# Patient Record
Sex: MALE | Race: BLACK OR AFRICAN AMERICAN | Employment: OTHER | URBAN - METROPOLITAN AREA
[De-identification: names, ages, dates, MRNs, and addresses within clinical notes are randomized per-mention and may not be internally consistent; named-entity substitution may affect disease eponyms.]

---

## 2019-10-15 ENCOUNTER — APPOINTMENT (OUTPATIENT)
Dept: GENERAL RADIOLOGY | Age: 64
DRG: 041 | End: 2019-10-15
Payer: MEDICARE

## 2019-10-15 ENCOUNTER — APPOINTMENT (OUTPATIENT)
Dept: CT IMAGING | Age: 64
DRG: 041 | End: 2019-10-15
Payer: MEDICARE

## 2019-10-15 ENCOUNTER — HOSPITAL ENCOUNTER (INPATIENT)
Age: 64
LOS: 4 days | Discharge: HOME HEALTH | DRG: 041 | End: 2019-10-19
Attending: EMERGENCY MEDICINE | Admitting: PSYCHIATRY & NEUROLOGY
Payer: MEDICARE

## 2019-10-15 DIAGNOSIS — I63.9 CEREBROVASCULAR ACCIDENT (CVA), UNSPECIFIED MECHANISM (HCC): Primary | ICD-10-CM

## 2019-10-15 LAB
% CKMB: 2.4 % (ref 0–3.5)
ABSOLUTE EOS #: 0.46 K/UL (ref 0–0.44)
ABSOLUTE IMMATURE GRANULOCYTE: 0.03 K/UL (ref 0–0.3)
ABSOLUTE LYMPH #: 2.54 K/UL (ref 1.1–3.7)
ABSOLUTE MONO #: 0.69 K/UL (ref 0.1–1.2)
ALLEN TEST: NORMAL
ANION GAP SERPL CALCULATED.3IONS-SCNC: 12 MMOL/L (ref 9–17)
ANION GAP: 10 MMOL/L (ref 7–16)
BASOPHILS # BLD: 1 % (ref 0–2)
BASOPHILS ABSOLUTE: 0.06 K/UL (ref 0–0.2)
BUN BLDV-MCNC: 8 MG/DL (ref 8–23)
BUN/CREAT BLD: ABNORMAL (ref 9–20)
CALCIUM SERPL-MCNC: 9.7 MG/DL (ref 8.6–10.4)
CHLORIDE BLD-SCNC: 98 MMOL/L (ref 98–107)
CK MB: 3.3 NG/ML
CKMB INTERPRETATION: ABNORMAL
CO2: 25 MMOL/L (ref 20–31)
CREAT SERPL-MCNC: 0.83 MG/DL (ref 0.7–1.2)
DIFFERENTIAL TYPE: ABNORMAL
EOSINOPHILS RELATIVE PERCENT: 6 % (ref 1–4)
FIO2: NORMAL
GFR AFRICAN AMERICAN: >60 ML/MIN
GFR NON-AFRICAN AMERICAN: >60 ML/MIN
GFR NON-AFRICAN AMERICAN: >60 ML/MIN
GFR SERPL CREATININE-BSD FRML MDRD: >60 ML/MIN
GFR SERPL CREATININE-BSD FRML MDRD: ABNORMAL ML/MIN/{1.73_M2}
GFR SERPL CREATININE-BSD FRML MDRD: ABNORMAL ML/MIN/{1.73_M2}
GFR SERPL CREATININE-BSD FRML MDRD: NORMAL ML/MIN/{1.73_M2}
GLUCOSE BLD-MCNC: 315 MG/DL (ref 75–110)
GLUCOSE BLD-MCNC: 344 MG/DL (ref 74–100)
GLUCOSE BLD-MCNC: 353 MG/DL (ref 70–99)
HCO3 VENOUS: 27.3 MMOL/L (ref 22–29)
HCT VFR BLD CALC: 47.2 % (ref 40.7–50.3)
HEMOGLOBIN: 15.6 G/DL (ref 13–17)
IMMATURE GRANULOCYTES: 0 %
INR BLD: 1.2
LYMPHOCYTES # BLD: 32 % (ref 24–43)
MCH RBC QN AUTO: 28.5 PG (ref 25.2–33.5)
MCHC RBC AUTO-ENTMCNC: 33.1 G/DL (ref 28.4–34.8)
MCV RBC AUTO: 86.1 FL (ref 82.6–102.9)
MODE: NORMAL
MONOCYTES # BLD: 9 % (ref 3–12)
MYOGLOBIN: 24 NG/ML (ref 28–72)
NEGATIVE BASE EXCESS, VEN: NORMAL (ref 0–2)
NRBC AUTOMATED: 0 PER 100 WBC
O2 DEVICE/FLOW/%: NORMAL
O2 SAT, VEN: 67 % (ref 60–85)
PARTIAL THROMBOPLASTIN TIME: 26.4 SEC (ref 20.5–30.5)
PATIENT TEMP: NORMAL
PCO2, VEN: 41.3 MM HG (ref 41–51)
PDW BLD-RTO: 13.7 % (ref 11.8–14.4)
PH VENOUS: 7.43 (ref 7.32–7.43)
PLATELET # BLD: 201 K/UL (ref 138–453)
PLATELET ESTIMATE: ABNORMAL
PMV BLD AUTO: 11.3 FL (ref 8.1–13.5)
PO2, VEN: 33.8 MM HG (ref 30–50)
POC CHLORIDE: 99 MMOL/L (ref 98–107)
POC CREATININE: 0.77 MG/DL (ref 0.51–1.19)
POC HEMATOCRIT: 47 % (ref 41–53)
POC HEMOGLOBIN: 16 G/DL (ref 13.5–17.5)
POC IONIZED CALCIUM: 1.18 MMOL/L (ref 1.15–1.33)
POC LACTIC ACID: 1.62 MMOL/L (ref 0.56–1.39)
POC PCO2 TEMP: NORMAL MM HG
POC PH TEMP: NORMAL
POC PO2 TEMP: NORMAL MM HG
POC POTASSIUM: 3.9 MMOL/L (ref 3.5–4.5)
POC SODIUM: 136 MMOL/L (ref 138–146)
POSITIVE BASE EXCESS, VEN: 3 (ref 0–3)
POTASSIUM SERPL-SCNC: 4.1 MMOL/L (ref 3.7–5.3)
PROTHROMBIN TIME: 12.1 SEC (ref 9–12)
RBC # BLD: 5.48 M/UL (ref 4.21–5.77)
RBC # BLD: ABNORMAL 10*6/UL
SAMPLE SITE: NORMAL
SEG NEUTROPHILS: 52 % (ref 36–65)
SEGMENTED NEUTROPHILS ABSOLUTE COUNT: 4.2 K/UL (ref 1.5–8.1)
SODIUM BLD-SCNC: 135 MMOL/L (ref 135–144)
TOTAL CK: 138 U/L (ref 39–308)
TOTAL CO2, VENOUS: 29 MMOL/L (ref 23–30)
TROPONIN INTERP: ABNORMAL
TROPONIN T: ABNORMAL NG/ML
TROPONIN, HIGH SENSITIVITY: 17 NG/L (ref 0–22)
WBC # BLD: 8 K/UL (ref 3.5–11.3)
WBC # BLD: ABNORMAL 10*3/UL

## 2019-10-15 PROCEDURE — 70450 CT HEAD/BRAIN W/O DYE: CPT

## 2019-10-15 PROCEDURE — 2580000003 HC RX 258: Performed by: STUDENT IN AN ORGANIZED HEALTH CARE EDUCATION/TRAINING PROGRAM

## 2019-10-15 PROCEDURE — 84295 ASSAY OF SERUM SODIUM: CPT

## 2019-10-15 PROCEDURE — 82947 ASSAY GLUCOSE BLOOD QUANT: CPT

## 2019-10-15 PROCEDURE — 99285 EMERGENCY DEPT VISIT HI MDM: CPT

## 2019-10-15 PROCEDURE — 93005 ELECTROCARDIOGRAM TRACING: CPT | Performed by: STUDENT IN AN ORGANIZED HEALTH CARE EDUCATION/TRAINING PROGRAM

## 2019-10-15 PROCEDURE — 99291 CRITICAL CARE FIRST HOUR: CPT | Performed by: PSYCHIATRY & NEUROLOGY

## 2019-10-15 PROCEDURE — 70496 CT ANGIOGRAPHY HEAD: CPT

## 2019-10-15 PROCEDURE — 84484 ASSAY OF TROPONIN QUANT: CPT

## 2019-10-15 PROCEDURE — 82803 BLOOD GASES ANY COMBINATION: CPT

## 2019-10-15 PROCEDURE — 82435 ASSAY OF BLOOD CHLORIDE: CPT

## 2019-10-15 PROCEDURE — 71045 X-RAY EXAM CHEST 1 VIEW: CPT

## 2019-10-15 PROCEDURE — 83605 ASSAY OF LACTIC ACID: CPT

## 2019-10-15 PROCEDURE — 82550 ASSAY OF CK (CPK): CPT

## 2019-10-15 PROCEDURE — 82553 CREATINE MB FRACTION: CPT

## 2019-10-15 PROCEDURE — 99284 EMERGENCY DEPT VISIT MOD MDM: CPT | Performed by: PSYCHIATRY & NEUROLOGY

## 2019-10-15 PROCEDURE — 82330 ASSAY OF CALCIUM: CPT

## 2019-10-15 PROCEDURE — C9248 INJ, CLEVIDIPINE BUTYRATE: HCPCS | Performed by: STUDENT IN AN ORGANIZED HEALTH CARE EDUCATION/TRAINING PROGRAM

## 2019-10-15 PROCEDURE — 83874 ASSAY OF MYOGLOBIN: CPT

## 2019-10-15 PROCEDURE — 96376 TX/PRO/DX INJ SAME DRUG ADON: CPT

## 2019-10-15 PROCEDURE — 82565 ASSAY OF CREATININE: CPT

## 2019-10-15 PROCEDURE — 2000000003 HC NEURO ICU R&B

## 2019-10-15 PROCEDURE — 80048 BASIC METABOLIC PNL TOTAL CA: CPT

## 2019-10-15 PROCEDURE — 3E03317 INTRODUCTION OF OTHER THROMBOLYTIC INTO PERIPHERAL VEIN, PERCUTANEOUS APPROACH: ICD-10-PCS | Performed by: EMERGENCY MEDICINE

## 2019-10-15 PROCEDURE — 85730 THROMBOPLASTIN TIME PARTIAL: CPT

## 2019-10-15 PROCEDURE — 85014 HEMATOCRIT: CPT

## 2019-10-15 PROCEDURE — 6370000000 HC RX 637 (ALT 250 FOR IP): Performed by: STUDENT IN AN ORGANIZED HEALTH CARE EDUCATION/TRAINING PROGRAM

## 2019-10-15 PROCEDURE — 6360000004 HC RX CONTRAST MEDICATION: Performed by: STUDENT IN AN ORGANIZED HEALTH CARE EDUCATION/TRAINING PROGRAM

## 2019-10-15 PROCEDURE — 96375 TX/PRO/DX INJ NEW DRUG ADDON: CPT

## 2019-10-15 PROCEDURE — 85025 COMPLETE CBC W/AUTO DIFF WBC: CPT

## 2019-10-15 PROCEDURE — 6360000002 HC RX W HCPCS: Performed by: STUDENT IN AN ORGANIZED HEALTH CARE EDUCATION/TRAINING PROGRAM

## 2019-10-15 PROCEDURE — 84132 ASSAY OF SERUM POTASSIUM: CPT

## 2019-10-15 PROCEDURE — 96365 THER/PROPH/DIAG IV INF INIT: CPT

## 2019-10-15 PROCEDURE — 85610 PROTHROMBIN TIME: CPT

## 2019-10-15 RX ORDER — SODIUM CHLORIDE 0.9 % (FLUSH) 0.9 %
10 SYRINGE (ML) INJECTION EVERY 12 HOURS SCHEDULED
Status: DISCONTINUED | OUTPATIENT
Start: 2019-10-15 | End: 2019-10-19 | Stop reason: HOSPADM

## 2019-10-15 RX ORDER — DEXTROSE MONOHYDRATE 25 G/50ML
12.5 INJECTION, SOLUTION INTRAVENOUS
Status: ACTIVE | OUTPATIENT
Start: 2019-10-15 | End: 2019-10-15

## 2019-10-15 RX ORDER — SODIUM CHLORIDE 0.9 % (FLUSH) 0.9 %
10 SYRINGE (ML) INJECTION PRN
Status: DISCONTINUED | OUTPATIENT
Start: 2019-10-15 | End: 2019-10-19 | Stop reason: HOSPADM

## 2019-10-15 RX ORDER — 0.9 % SODIUM CHLORIDE 0.9 %
50 INTRAVENOUS SOLUTION INTRAVENOUS ONCE
Status: COMPLETED | OUTPATIENT
Start: 2019-10-15 | End: 2019-10-16

## 2019-10-15 RX ADMIN — IOHEXOL 90 ML: 350 INJECTION, SOLUTION INTRAVENOUS at 22:07

## 2019-10-15 RX ADMIN — SODIUM CHLORIDE 50 ML: 9 INJECTION, SOLUTION INTRAVENOUS at 22:05

## 2019-10-15 RX ADMIN — INSULIN HUMAN 10 UNITS: 100 INJECTION, SOLUTION PARENTERAL at 22:27

## 2019-10-15 RX ADMIN — ALTEPLASE 80.8 MG: KIT at 21:12

## 2019-10-15 RX ADMIN — CLEVIPIDINE 4 MG/HR: 0.5 EMULSION INTRAVENOUS at 22:48

## 2019-10-15 RX ADMIN — CLEVIPIDINE 2 MG/HR: 0.5 EMULSION INTRAVENOUS at 22:16

## 2019-10-15 RX ADMIN — ALTEPLASE 9 MG: KIT at 21:08

## 2019-10-15 SDOH — HEALTH STABILITY: MENTAL HEALTH: HOW OFTEN DO YOU HAVE A DRINK CONTAINING ALCOHOL?: NEVER

## 2019-10-16 ENCOUNTER — APPOINTMENT (OUTPATIENT)
Dept: CT IMAGING | Age: 64
DRG: 041 | End: 2019-10-16
Payer: MEDICARE

## 2019-10-16 LAB
ABSOLUTE EOS #: 0.62 K/UL (ref 0–0.44)
ABSOLUTE IMMATURE GRANULOCYTE: <0.03 K/UL (ref 0–0.3)
ABSOLUTE LYMPH #: 2.13 K/UL (ref 1.1–3.7)
ABSOLUTE MONO #: 0.62 K/UL (ref 0.1–1.2)
ANION GAP SERPL CALCULATED.3IONS-SCNC: 16 MMOL/L (ref 9–17)
BASOPHILS # BLD: 1 % (ref 0–2)
BASOPHILS ABSOLUTE: 0.08 K/UL (ref 0–0.2)
BUN BLDV-MCNC: 5 MG/DL (ref 8–23)
BUN/CREAT BLD: ABNORMAL (ref 9–20)
CALCIUM SERPL-MCNC: 9.2 MG/DL (ref 8.6–10.4)
CHLORIDE BLD-SCNC: 101 MMOL/L (ref 98–107)
CHOLESTEROL/HDL RATIO: 7.7
CHOLESTEROL: 294 MG/DL
CO2: 20 MMOL/L (ref 20–31)
CREAT SERPL-MCNC: 0.48 MG/DL (ref 0.7–1.2)
DIFFERENTIAL TYPE: ABNORMAL
EKG ATRIAL RATE: 81 BPM
EKG P AXIS: 67 DEGREES
EKG P-R INTERVAL: 174 MS
EKG Q-T INTERVAL: 378 MS
EKG QRS DURATION: 90 MS
EKG QTC CALCULATION (BAZETT): 439 MS
EKG R AXIS: -15 DEGREES
EKG T AXIS: 24 DEGREES
EKG VENTRICULAR RATE: 81 BPM
EOSINOPHILS RELATIVE PERCENT: 9 % (ref 1–4)
ESTIMATED AVERAGE GLUCOSE: 306 MG/DL
GFR AFRICAN AMERICAN: >60 ML/MIN
GFR NON-AFRICAN AMERICAN: >60 ML/MIN
GFR SERPL CREATININE-BSD FRML MDRD: ABNORMAL ML/MIN/{1.73_M2}
GFR SERPL CREATININE-BSD FRML MDRD: ABNORMAL ML/MIN/{1.73_M2}
GLUCOSE BLD-MCNC: 211 MG/DL
GLUCOSE BLD-MCNC: 211 MG/DL (ref 75–110)
GLUCOSE BLD-MCNC: 257 MG/DL (ref 75–110)
GLUCOSE BLD-MCNC: 264 MG/DL (ref 70–99)
GLUCOSE BLD-MCNC: 265 MG/DL (ref 75–110)
GLUCOSE BLD-MCNC: 310 MG/DL (ref 75–110)
GLUCOSE BLD-MCNC: 315 MG/DL (ref 75–110)
HBA1C MFR BLD: 12.3 % (ref 4–6)
HCT VFR BLD CALC: 49.9 % (ref 40.7–50.3)
HDLC SERPL-MCNC: 38 MG/DL
HEMOGLOBIN: 16.4 G/DL (ref 13–17)
IMMATURE GRANULOCYTES: 0 %
LDL CHOLESTEROL: 216 MG/DL (ref 0–130)
LYMPHOCYTES # BLD: 29 % (ref 24–43)
MAGNESIUM: 2 MG/DL (ref 1.6–2.6)
MCH RBC QN AUTO: 28.6 PG (ref 25.2–33.5)
MCHC RBC AUTO-ENTMCNC: 32.9 G/DL (ref 28.4–34.8)
MCV RBC AUTO: 86.9 FL (ref 82.6–102.9)
MONOCYTES # BLD: 9 % (ref 3–12)
MRSA, DNA, NASAL: NORMAL
NRBC AUTOMATED: 0 PER 100 WBC
PDW BLD-RTO: 13.8 % (ref 11.8–14.4)
PHOSPHORUS: 3.2 MG/DL (ref 2.5–4.5)
PLATELET # BLD: 221 K/UL (ref 138–453)
PLATELET ESTIMATE: ABNORMAL
PMV BLD AUTO: 10.1 FL (ref 8.1–13.5)
POTASSIUM SERPL-SCNC: 3.9 MMOL/L (ref 3.7–5.3)
RBC # BLD: 5.74 M/UL (ref 4.21–5.77)
RBC # BLD: ABNORMAL 10*6/UL
SEG NEUTROPHILS: 52 % (ref 36–65)
SEGMENTED NEUTROPHILS ABSOLUTE COUNT: 3.86 K/UL (ref 1.5–8.1)
SODIUM BLD-SCNC: 137 MMOL/L (ref 135–144)
SPECIMEN DESCRIPTION: NORMAL
TRIGL SERPL-MCNC: 200 MG/DL
VLDLC SERPL CALC-MCNC: ABNORMAL MG/DL (ref 1–30)
WBC # BLD: 7.3 K/UL (ref 3.5–11.3)
WBC # BLD: ABNORMAL 10*3/UL

## 2019-10-16 PROCEDURE — 97166 OT EVAL MOD COMPLEX 45 MIN: CPT

## 2019-10-16 PROCEDURE — 80048 BASIC METABOLIC PNL TOTAL CA: CPT

## 2019-10-16 PROCEDURE — 84100 ASSAY OF PHOSPHORUS: CPT

## 2019-10-16 PROCEDURE — 36415 COLL VENOUS BLD VENIPUNCTURE: CPT

## 2019-10-16 PROCEDURE — 97530 THERAPEUTIC ACTIVITIES: CPT

## 2019-10-16 PROCEDURE — 70450 CT HEAD/BRAIN W/O DYE: CPT

## 2019-10-16 PROCEDURE — 2580000003 HC RX 258: Performed by: STUDENT IN AN ORGANIZED HEALTH CARE EDUCATION/TRAINING PROGRAM

## 2019-10-16 PROCEDURE — 2060000000 HC ICU INTERMEDIATE R&B

## 2019-10-16 PROCEDURE — 6370000000 HC RX 637 (ALT 250 FOR IP): Performed by: NURSE PRACTITIONER

## 2019-10-16 PROCEDURE — 6370000000 HC RX 637 (ALT 250 FOR IP): Performed by: INTERNAL MEDICINE

## 2019-10-16 PROCEDURE — 85025 COMPLETE CBC W/AUTO DIFF WBC: CPT

## 2019-10-16 PROCEDURE — 97162 PT EVAL MOD COMPLEX 30 MIN: CPT

## 2019-10-16 PROCEDURE — 83735 ASSAY OF MAGNESIUM: CPT

## 2019-10-16 PROCEDURE — 6370000000 HC RX 637 (ALT 250 FOR IP): Performed by: STUDENT IN AN ORGANIZED HEALTH CARE EDUCATION/TRAINING PROGRAM

## 2019-10-16 PROCEDURE — 87641 MR-STAPH DNA AMP PROBE: CPT

## 2019-10-16 PROCEDURE — 2000000003 HC NEURO ICU R&B

## 2019-10-16 PROCEDURE — 80061 LIPID PANEL: CPT

## 2019-10-16 PROCEDURE — C9248 INJ, CLEVIDIPINE BUTYRATE: HCPCS | Performed by: STUDENT IN AN ORGANIZED HEALTH CARE EDUCATION/TRAINING PROGRAM

## 2019-10-16 PROCEDURE — 82947 ASSAY GLUCOSE BLOOD QUANT: CPT

## 2019-10-16 PROCEDURE — 92523 SPEECH SOUND LANG COMPREHEN: CPT

## 2019-10-16 PROCEDURE — 6360000002 HC RX W HCPCS: Performed by: STUDENT IN AN ORGANIZED HEALTH CARE EDUCATION/TRAINING PROGRAM

## 2019-10-16 PROCEDURE — 83036 HEMOGLOBIN GLYCOSYLATED A1C: CPT

## 2019-10-16 PROCEDURE — 97535 SELF CARE MNGMENT TRAINING: CPT

## 2019-10-16 RX ORDER — ASPIRIN 300 MG/1
300 SUPPOSITORY RECTAL ONCE
Status: DISCONTINUED | OUTPATIENT
Start: 2019-10-16 | End: 2019-10-16

## 2019-10-16 RX ORDER — HYDRALAZINE HYDROCHLORIDE 20 MG/ML
10 INJECTION INTRAMUSCULAR; INTRAVENOUS EVERY 4 HOURS PRN
Status: DISCONTINUED | OUTPATIENT
Start: 2019-10-16 | End: 2019-10-19 | Stop reason: HOSPADM

## 2019-10-16 RX ORDER — SODIUM CHLORIDE 0.9 % (FLUSH) 0.9 %
10 SYRINGE (ML) INJECTION EVERY 12 HOURS SCHEDULED
Status: DISCONTINUED | OUTPATIENT
Start: 2019-10-16 | End: 2019-10-19 | Stop reason: HOSPADM

## 2019-10-16 RX ORDER — DEXTROSE MONOHYDRATE 50 MG/ML
100 INJECTION, SOLUTION INTRAVENOUS PRN
Status: DISCONTINUED | OUTPATIENT
Start: 2019-10-16 | End: 2019-10-19 | Stop reason: HOSPADM

## 2019-10-16 RX ORDER — PIOGLITAZONEHYDROCHLORIDE 30 MG/1
30 TABLET ORAL DAILY
COMMUNITY

## 2019-10-16 RX ORDER — NICOTINE POLACRILEX 4 MG
15 LOZENGE BUCCAL PRN
Status: DISCONTINUED | OUTPATIENT
Start: 2019-10-16 | End: 2019-10-19 | Stop reason: HOSPADM

## 2019-10-16 RX ORDER — BUPROPION HYDROCHLORIDE 150 MG/1
150 TABLET ORAL EVERY MORNING
COMMUNITY

## 2019-10-16 RX ORDER — ASPIRIN 81 MG/1
81 TABLET ORAL DAILY
Status: DISCONTINUED | OUTPATIENT
Start: 2019-10-17 | End: 2019-10-16

## 2019-10-16 RX ORDER — BISACODYL 10 MG
10 SUPPOSITORY, RECTAL RECTAL DAILY PRN
Status: DISCONTINUED | OUTPATIENT
Start: 2019-10-16 | End: 2019-10-19 | Stop reason: HOSPADM

## 2019-10-16 RX ORDER — ASPIRIN 81 MG/1
81 TABLET, CHEWABLE ORAL DAILY
COMMUNITY

## 2019-10-16 RX ORDER — DEXTROSE MONOHYDRATE 25 G/50ML
12.5 INJECTION, SOLUTION INTRAVENOUS PRN
Status: DISCONTINUED | OUTPATIENT
Start: 2019-10-16 | End: 2019-10-19 | Stop reason: HOSPADM

## 2019-10-16 RX ORDER — DOCUSATE SODIUM 100 MG/1
100 CAPSULE, LIQUID FILLED ORAL 2 TIMES DAILY
Status: DISCONTINUED | OUTPATIENT
Start: 2019-10-16 | End: 2019-10-19 | Stop reason: HOSPADM

## 2019-10-16 RX ORDER — SODIUM CHLORIDE 0.9 % (FLUSH) 0.9 %
10 SYRINGE (ML) INJECTION PRN
Status: DISCONTINUED | OUTPATIENT
Start: 2019-10-16 | End: 2019-10-19 | Stop reason: HOSPADM

## 2019-10-16 RX ORDER — GLIPIZIDE 10 MG/1
10 TABLET ORAL
COMMUNITY

## 2019-10-16 RX ORDER — MONTELUKAST SODIUM 10 MG/1
10 TABLET ORAL NIGHTLY
COMMUNITY

## 2019-10-16 RX ORDER — LISINOPRIL AND HYDROCHLOROTHIAZIDE 20; 12.5 MG/1; MG/1
1 TABLET ORAL 2 TIMES DAILY
COMMUNITY

## 2019-10-16 RX ORDER — UREA 10 %
3 LOTION (ML) TOPICAL NIGHTLY PRN
Status: DISCONTINUED | OUTPATIENT
Start: 2019-10-16 | End: 2019-10-19 | Stop reason: HOSPADM

## 2019-10-16 RX ORDER — ATORVASTATIN CALCIUM 80 MG/1
80 TABLET, FILM COATED ORAL NIGHTLY
Status: DISCONTINUED | OUTPATIENT
Start: 2019-10-16 | End: 2019-10-19 | Stop reason: HOSPADM

## 2019-10-16 RX ORDER — LABETALOL 20 MG/4 ML (5 MG/ML) INTRAVENOUS SYRINGE
10 EVERY 4 HOURS PRN
Status: DISCONTINUED | OUTPATIENT
Start: 2019-10-16 | End: 2019-10-19 | Stop reason: HOSPADM

## 2019-10-16 RX ORDER — LISINOPRIL AND HYDROCHLOROTHIAZIDE 20; 12.5 MG/1; MG/1
1 TABLET ORAL 2 TIMES DAILY
Status: DISCONTINUED | OUTPATIENT
Start: 2019-10-16 | End: 2019-10-19 | Stop reason: HOSPADM

## 2019-10-16 RX ORDER — ASPIRIN 81 MG/1
81 TABLET, CHEWABLE ORAL DAILY
Status: DISCONTINUED | OUTPATIENT
Start: 2019-10-17 | End: 2019-10-19 | Stop reason: HOSPADM

## 2019-10-16 RX ORDER — ASPIRIN 300 MG/1
300 SUPPOSITORY RECTAL DAILY
Status: DISCONTINUED | OUTPATIENT
Start: 2019-10-17 | End: 2019-10-16

## 2019-10-16 RX ORDER — ONDANSETRON 2 MG/ML
4 INJECTION INTRAMUSCULAR; INTRAVENOUS EVERY 6 HOURS PRN
Status: DISCONTINUED | OUTPATIENT
Start: 2019-10-16 | End: 2019-10-19 | Stop reason: HOSPADM

## 2019-10-16 RX ORDER — FAMOTIDINE 20 MG/1
20 TABLET, FILM COATED ORAL 2 TIMES DAILY
Status: DISCONTINUED | OUTPATIENT
Start: 2019-10-16 | End: 2019-10-16

## 2019-10-16 RX ORDER — METFORMIN HYDROCHLORIDE 500 MG/1
500 TABLET, EXTENDED RELEASE ORAL 2 TIMES DAILY
COMMUNITY

## 2019-10-16 RX ORDER — WARFARIN SODIUM 5 MG/1
5 TABLET ORAL
Status: ON HOLD | COMMUNITY
End: 2019-10-19 | Stop reason: HOSPADM

## 2019-10-16 RX ADMIN — CLEVIPIDINE 16 MG/HR: 0.5 EMULSION INTRAVENOUS at 17:20

## 2019-10-16 RX ADMIN — CLEVIPIDINE 16 MG/HR: 0.5 EMULSION INTRAVENOUS at 14:21

## 2019-10-16 RX ADMIN — DOCUSATE SODIUM 100 MG: 100 CAPSULE, LIQUID FILLED ORAL at 19:54

## 2019-10-16 RX ADMIN — LISINOPRIL AND HYDROCHLOROTHIAZIDE 1 TABLET: 12.5; 2 TABLET ORAL at 19:53

## 2019-10-16 RX ADMIN — INSULIN LISPRO 12 UNITS: 100 INJECTION, SOLUTION INTRAVENOUS; SUBCUTANEOUS at 18:38

## 2019-10-16 RX ADMIN — CLEVIPIDINE 4 MG/HR: 0.5 EMULSION INTRAVENOUS at 06:57

## 2019-10-16 RX ADMIN — LISINOPRIL AND HYDROCHLOROTHIAZIDE 1 TABLET: 12.5; 2 TABLET ORAL at 15:29

## 2019-10-16 RX ADMIN — SODIUM CHLORIDE, PRESERVATIVE FREE 10 ML: 5 INJECTION INTRAVENOUS at 10:11

## 2019-10-16 RX ADMIN — DOCUSATE SODIUM 100 MG: 100 CAPSULE, LIQUID FILLED ORAL at 10:10

## 2019-10-16 RX ADMIN — INSULIN LISPRO 6 UNITS: 100 INJECTION, SOLUTION INTRAVENOUS; SUBCUTANEOUS at 20:09

## 2019-10-16 RX ADMIN — ATORVASTATIN CALCIUM 80 MG: 80 TABLET, FILM COATED ORAL at 19:53

## 2019-10-16 RX ADMIN — INSULIN LISPRO 9 UNITS: 100 INJECTION, SOLUTION INTRAVENOUS; SUBCUTANEOUS at 13:56

## 2019-10-16 ASSESSMENT — PAIN SCALES - GENERAL
PAINLEVEL_OUTOF10: 0

## 2019-10-16 ASSESSMENT — ENCOUNTER SYMPTOMS
COLOR CHANGE: 0
ABDOMINAL PAIN: 0
EYE REDNESS: 0
EYE DISCHARGE: 0
SHORTNESS OF BREATH: 0

## 2019-10-16 ASSESSMENT — PAIN DESCRIPTION - LOCATION: LOCATION: HEAD

## 2019-10-16 ASSESSMENT — PAIN DESCRIPTION - PAIN TYPE: TYPE: ACUTE PAIN

## 2019-10-17 ENCOUNTER — APPOINTMENT (OUTPATIENT)
Dept: CARDIAC CATH/INVASIVE PROCEDURES | Age: 64
DRG: 041 | End: 2019-10-17
Payer: MEDICARE

## 2019-10-17 ENCOUNTER — APPOINTMENT (OUTPATIENT)
Dept: MRI IMAGING | Age: 64
DRG: 041 | End: 2019-10-17
Payer: MEDICARE

## 2019-10-17 LAB
ABSOLUTE EOS #: 0.59 K/UL (ref 0–0.44)
ABSOLUTE IMMATURE GRANULOCYTE: <0.03 K/UL (ref 0–0.3)
ABSOLUTE LYMPH #: 2.32 K/UL (ref 1.1–3.7)
ABSOLUTE MONO #: 0.66 K/UL (ref 0.1–1.2)
ANION GAP SERPL CALCULATED.3IONS-SCNC: 12 MMOL/L (ref 9–17)
BASOPHILS # BLD: 1 % (ref 0–2)
BASOPHILS ABSOLUTE: 0.08 K/UL (ref 0–0.2)
BUN BLDV-MCNC: 8 MG/DL (ref 8–23)
BUN/CREAT BLD: ABNORMAL (ref 9–20)
CALCIUM SERPL-MCNC: 9.3 MG/DL (ref 8.6–10.4)
CHLORIDE BLD-SCNC: 97 MMOL/L (ref 98–107)
CO2: 26 MMOL/L (ref 20–31)
CREAT SERPL-MCNC: 0.76 MG/DL (ref 0.7–1.2)
DIFFERENTIAL TYPE: ABNORMAL
EOSINOPHILS RELATIVE PERCENT: 8 % (ref 1–4)
GFR AFRICAN AMERICAN: >60 ML/MIN
GFR NON-AFRICAN AMERICAN: >60 ML/MIN
GFR SERPL CREATININE-BSD FRML MDRD: ABNORMAL ML/MIN/{1.73_M2}
GFR SERPL CREATININE-BSD FRML MDRD: ABNORMAL ML/MIN/{1.73_M2}
GLUCOSE BLD-MCNC: 232 MG/DL (ref 70–99)
GLUCOSE BLD-MCNC: 251 MG/DL (ref 75–110)
GLUCOSE BLD-MCNC: 273 MG/DL (ref 75–110)
GLUCOSE BLD-MCNC: 309 MG/DL (ref 75–110)
GLUCOSE BLD-MCNC: 330 MG/DL (ref 75–110)
HCT VFR BLD CALC: 51.5 % (ref 40.7–50.3)
HEMOGLOBIN: 16.7 G/DL (ref 13–17)
IMMATURE GRANULOCYTES: 0 %
LV EF: 40 %
LVEF MODALITY: NORMAL
LYMPHOCYTES # BLD: 32 % (ref 24–43)
MAGNESIUM: 1.9 MG/DL (ref 1.6–2.6)
MCH RBC QN AUTO: 28.2 PG (ref 25.2–33.5)
MCHC RBC AUTO-ENTMCNC: 32.4 G/DL (ref 28.4–34.8)
MCV RBC AUTO: 86.8 FL (ref 82.6–102.9)
MONOCYTES # BLD: 9 % (ref 3–12)
NRBC AUTOMATED: 0 PER 100 WBC
PDW BLD-RTO: 13.6 % (ref 11.8–14.4)
PHOSPHORUS: 5.4 MG/DL (ref 2.5–4.5)
PLATELET # BLD: 196 K/UL (ref 138–453)
PLATELET ESTIMATE: ABNORMAL
PMV BLD AUTO: 10.9 FL (ref 8.1–13.5)
POTASSIUM SERPL-SCNC: 3.4 MMOL/L (ref 3.7–5.3)
RBC # BLD: 5.93 M/UL (ref 4.21–5.77)
RBC # BLD: ABNORMAL 10*6/UL
SEG NEUTROPHILS: 50 % (ref 36–65)
SEGMENTED NEUTROPHILS ABSOLUTE COUNT: 3.69 K/UL (ref 1.5–8.1)
SODIUM BLD-SCNC: 135 MMOL/L (ref 135–144)
WBC # BLD: 7.4 K/UL (ref 3.5–11.3)
WBC # BLD: ABNORMAL 10*3/UL

## 2019-10-17 PROCEDURE — 2500000003 HC RX 250 WO HCPCS: Performed by: INTERNAL MEDICINE

## 2019-10-17 PROCEDURE — 33285 INSJ SUBQ CAR RHYTHM MNTR: CPT | Performed by: INTERNAL MEDICINE

## 2019-10-17 PROCEDURE — 6370000000 HC RX 637 (ALT 250 FOR IP): Performed by: STUDENT IN AN ORGANIZED HEALTH CARE EDUCATION/TRAINING PROGRAM

## 2019-10-17 PROCEDURE — 83735 ASSAY OF MAGNESIUM: CPT

## 2019-10-17 PROCEDURE — 82947 ASSAY GLUCOSE BLOOD QUANT: CPT

## 2019-10-17 PROCEDURE — 6360000002 HC RX W HCPCS: Performed by: STUDENT IN AN ORGANIZED HEALTH CARE EDUCATION/TRAINING PROGRAM

## 2019-10-17 PROCEDURE — 2580000003 HC RX 258: Performed by: INTERNAL MEDICINE

## 2019-10-17 PROCEDURE — 84100 ASSAY OF PHOSPHORUS: CPT

## 2019-10-17 PROCEDURE — 6370000000 HC RX 637 (ALT 250 FOR IP): Performed by: INTERNAL MEDICINE

## 2019-10-17 PROCEDURE — 2580000003 HC RX 258: Performed by: STUDENT IN AN ORGANIZED HEALTH CARE EDUCATION/TRAINING PROGRAM

## 2019-10-17 PROCEDURE — 85025 COMPLETE CBC W/AUTO DIFF WBC: CPT

## 2019-10-17 PROCEDURE — 99233 SBSQ HOSP IP/OBS HIGH 50: CPT | Performed by: PSYCHIATRY & NEUROLOGY

## 2019-10-17 PROCEDURE — 2060000000 HC ICU INTERMEDIATE R&B

## 2019-10-17 PROCEDURE — C1764 EVENT RECORDER, CARDIAC: HCPCS

## 2019-10-17 PROCEDURE — 0JH602Z INSERTION OF MONITORING DEVICE INTO CHEST SUBCUTANEOUS TISSUE AND FASCIA, OPEN APPROACH: ICD-10-PCS | Performed by: STUDENT IN AN ORGANIZED HEALTH CARE EDUCATION/TRAINING PROGRAM

## 2019-10-17 PROCEDURE — 93312 ECHO TRANSESOPHAGEAL: CPT

## 2019-10-17 PROCEDURE — 2709999900 HC NON-CHARGEABLE SUPPLY

## 2019-10-17 PROCEDURE — 2500000003 HC RX 250 WO HCPCS

## 2019-10-17 PROCEDURE — 97127 HC SP THER IVNTJ W/FOCUS COG FUNCJ: CPT

## 2019-10-17 PROCEDURE — 93325 DOPPLER ECHO COLOR FLOW MAPG: CPT

## 2019-10-17 PROCEDURE — 80048 BASIC METABOLIC PNL TOTAL CA: CPT

## 2019-10-17 PROCEDURE — B246ZZ4 ULTRASONOGRAPHY OF RIGHT AND LEFT HEART, TRANSESOPHAGEAL: ICD-10-PCS | Performed by: STUDENT IN AN ORGANIZED HEALTH CARE EDUCATION/TRAINING PROGRAM

## 2019-10-17 PROCEDURE — 70551 MRI BRAIN STEM W/O DYE: CPT

## 2019-10-17 PROCEDURE — 6360000002 HC RX W HCPCS

## 2019-10-17 PROCEDURE — 92507 TX SP LANG VOICE COMM INDIV: CPT

## 2019-10-17 PROCEDURE — C9248 INJ, CLEVIDIPINE BUTYRATE: HCPCS | Performed by: STUDENT IN AN ORGANIZED HEALTH CARE EDUCATION/TRAINING PROGRAM

## 2019-10-17 PROCEDURE — 36415 COLL VENOUS BLD VENIPUNCTURE: CPT

## 2019-10-17 RX ORDER — MIDAZOLAM HYDROCHLORIDE 1 MG/ML
2 INJECTION INTRAMUSCULAR; INTRAVENOUS ONCE
Status: DISCONTINUED | OUTPATIENT
Start: 2019-10-17 | End: 2019-10-18

## 2019-10-17 RX ORDER — SODIUM CHLORIDE 9 MG/ML
INJECTION, SOLUTION INTRAVENOUS CONTINUOUS
Status: DISCONTINUED | OUTPATIENT
Start: 2019-10-17 | End: 2019-10-18

## 2019-10-17 RX ORDER — FENTANYL CITRATE 50 UG/ML
100 INJECTION, SOLUTION INTRAMUSCULAR; INTRAVENOUS ONCE
Status: DISCONTINUED | OUTPATIENT
Start: 2019-10-17 | End: 2019-10-18

## 2019-10-17 RX ORDER — POTASSIUM CHLORIDE 7.45 MG/ML
10 INJECTION INTRAVENOUS PRN
Status: DISCONTINUED | OUTPATIENT
Start: 2019-10-17 | End: 2019-10-19 | Stop reason: HOSPADM

## 2019-10-17 RX ORDER — SODIUM CHLORIDE 0.9 % (FLUSH) 0.9 %
10 SYRINGE (ML) INJECTION EVERY 12 HOURS SCHEDULED
Status: DISCONTINUED | OUTPATIENT
Start: 2019-10-17 | End: 2019-10-19 | Stop reason: HOSPADM

## 2019-10-17 RX ORDER — INSULIN GLARGINE 100 [IU]/ML
20 INJECTION, SOLUTION SUBCUTANEOUS DAILY
Status: DISCONTINUED | OUTPATIENT
Start: 2019-10-17 | End: 2019-10-19 | Stop reason: HOSPADM

## 2019-10-17 RX ORDER — SODIUM CHLORIDE 0.9 % (FLUSH) 0.9 %
10 SYRINGE (ML) INJECTION PRN
Status: DISCONTINUED | OUTPATIENT
Start: 2019-10-17 | End: 2019-10-19 | Stop reason: HOSPADM

## 2019-10-17 RX ORDER — POTASSIUM CHLORIDE 20 MEQ/1
40 TABLET, EXTENDED RELEASE ORAL PRN
Status: DISCONTINUED | OUTPATIENT
Start: 2019-10-17 | End: 2019-10-19 | Stop reason: HOSPADM

## 2019-10-17 RX ADMIN — ASPIRIN 81 MG: 81 TABLET, CHEWABLE ORAL at 18:41

## 2019-10-17 RX ADMIN — ENOXAPARIN SODIUM 40 MG: 40 INJECTION SUBCUTANEOUS at 18:41

## 2019-10-17 RX ADMIN — INSULIN LISPRO 6 UNITS: 100 INJECTION, SOLUTION INTRAVENOUS; SUBCUTANEOUS at 20:51

## 2019-10-17 RX ADMIN — INSULIN LISPRO 12 UNITS: 100 INJECTION, SOLUTION INTRAVENOUS; SUBCUTANEOUS at 18:33

## 2019-10-17 RX ADMIN — LISINOPRIL AND HYDROCHLOROTHIAZIDE 1 TABLET: 12.5; 2 TABLET ORAL at 20:46

## 2019-10-17 RX ADMIN — Medication 10 MG: at 13:33

## 2019-10-17 RX ADMIN — SODIUM CHLORIDE: 9 INJECTION, SOLUTION INTRAVENOUS at 14:03

## 2019-10-17 RX ADMIN — LISINOPRIL AND HYDROCHLOROTHIAZIDE 1 TABLET: 12.5; 2 TABLET ORAL at 11:27

## 2019-10-17 RX ADMIN — CLEVIPIDINE 4 MG/HR: 0.5 EMULSION INTRAVENOUS at 02:22

## 2019-10-17 RX ADMIN — DOCUSATE SODIUM 100 MG: 100 CAPSULE, LIQUID FILLED ORAL at 20:46

## 2019-10-17 RX ADMIN — INSULIN GLARGINE 20 UNITS: 100 INJECTION, SOLUTION SUBCUTANEOUS at 11:28

## 2019-10-17 RX ADMIN — SODIUM CHLORIDE, PRESERVATIVE FREE 10 ML: 5 INJECTION INTRAVENOUS at 11:33

## 2019-10-17 RX ADMIN — SODIUM CHLORIDE, PRESERVATIVE FREE 10 ML: 5 INJECTION INTRAVENOUS at 20:46

## 2019-10-17 RX ADMIN — ATORVASTATIN CALCIUM 80 MG: 80 TABLET, FILM COATED ORAL at 20:46

## 2019-10-18 LAB
ABSOLUTE EOS #: 0.56 K/UL (ref 0–0.44)
ABSOLUTE IMMATURE GRANULOCYTE: 0.04 K/UL (ref 0–0.3)
ABSOLUTE LYMPH #: 2.28 K/UL (ref 1.1–3.7)
ABSOLUTE MONO #: 0.79 K/UL (ref 0.1–1.2)
ANION GAP SERPL CALCULATED.3IONS-SCNC: 14 MMOL/L (ref 9–17)
BASOPHILS # BLD: 1 % (ref 0–2)
BASOPHILS ABSOLUTE: 0.06 K/UL (ref 0–0.2)
BUN BLDV-MCNC: 11 MG/DL (ref 8–23)
BUN/CREAT BLD: ABNORMAL (ref 9–20)
CALCIUM SERPL-MCNC: 9.2 MG/DL (ref 8.6–10.4)
CHLORIDE BLD-SCNC: 96 MMOL/L (ref 98–107)
CO2: 24 MMOL/L (ref 20–31)
CREAT SERPL-MCNC: 0.79 MG/DL (ref 0.7–1.2)
DIFFERENTIAL TYPE: ABNORMAL
EOSINOPHILS RELATIVE PERCENT: 8 % (ref 1–4)
GFR AFRICAN AMERICAN: >60 ML/MIN
GFR NON-AFRICAN AMERICAN: >60 ML/MIN
GFR SERPL CREATININE-BSD FRML MDRD: ABNORMAL ML/MIN/{1.73_M2}
GFR SERPL CREATININE-BSD FRML MDRD: ABNORMAL ML/MIN/{1.73_M2}
GLUCOSE BLD-MCNC: 225 MG/DL (ref 70–99)
GLUCOSE BLD-MCNC: 238 MG/DL (ref 75–110)
GLUCOSE BLD-MCNC: 244 MG/DL (ref 75–110)
GLUCOSE BLD-MCNC: 288 MG/DL (ref 75–110)
GLUCOSE BLD-MCNC: 350 MG/DL (ref 75–110)
HCT VFR BLD CALC: 50.9 % (ref 40.7–50.3)
HEMOGLOBIN: 16.8 G/DL (ref 13–17)
IMMATURE GRANULOCYTES: 1 %
LYMPHOCYTES # BLD: 32 % (ref 24–43)
MAGNESIUM: 2 MG/DL (ref 1.6–2.6)
MCH RBC QN AUTO: 28.6 PG (ref 25.2–33.5)
MCHC RBC AUTO-ENTMCNC: 33 G/DL (ref 28.4–34.8)
MCV RBC AUTO: 86.6 FL (ref 82.6–102.9)
MONOCYTES # BLD: 11 % (ref 3–12)
NRBC AUTOMATED: 0.3 PER 100 WBC
PDW BLD-RTO: 13.9 % (ref 11.8–14.4)
PHOSPHORUS: 4.5 MG/DL (ref 2.5–4.5)
PLATELET # BLD: 285 K/UL (ref 138–453)
PLATELET ESTIMATE: ABNORMAL
PMV BLD AUTO: 12 FL (ref 8.1–13.5)
POTASSIUM SERPL-SCNC: 3.3 MMOL/L (ref 3.7–5.3)
RBC # BLD: 5.88 M/UL (ref 4.21–5.77)
RBC # BLD: ABNORMAL 10*6/UL
SEG NEUTROPHILS: 47 % (ref 36–65)
SEGMENTED NEUTROPHILS ABSOLUTE COUNT: 3.38 K/UL (ref 1.5–8.1)
SODIUM BLD-SCNC: 134 MMOL/L (ref 135–144)
WBC # BLD: 7.1 K/UL (ref 3.5–11.3)
WBC # BLD: ABNORMAL 10*3/UL

## 2019-10-18 PROCEDURE — 99222 1ST HOSP IP/OBS MODERATE 55: CPT | Performed by: PHYSICAL MEDICINE & REHABILITATION

## 2019-10-18 PROCEDURE — 80048 BASIC METABOLIC PNL TOTAL CA: CPT

## 2019-10-18 PROCEDURE — 92507 TX SP LANG VOICE COMM INDIV: CPT

## 2019-10-18 PROCEDURE — 97530 THERAPEUTIC ACTIVITIES: CPT

## 2019-10-18 PROCEDURE — 6370000000 HC RX 637 (ALT 250 FOR IP): Performed by: STUDENT IN AN ORGANIZED HEALTH CARE EDUCATION/TRAINING PROGRAM

## 2019-10-18 PROCEDURE — 97116 GAIT TRAINING THERAPY: CPT

## 2019-10-18 PROCEDURE — 97127 HC SP THER IVNTJ W/FOCUS COG FUNCJ: CPT

## 2019-10-18 PROCEDURE — G0108 DIAB MANAGE TRN  PER INDIV: HCPCS

## 2019-10-18 PROCEDURE — 83735 ASSAY OF MAGNESIUM: CPT

## 2019-10-18 PROCEDURE — 99233 SBSQ HOSP IP/OBS HIGH 50: CPT | Performed by: PSYCHIATRY & NEUROLOGY

## 2019-10-18 PROCEDURE — 82947 ASSAY GLUCOSE BLOOD QUANT: CPT

## 2019-10-18 PROCEDURE — 6370000000 HC RX 637 (ALT 250 FOR IP): Performed by: INTERNAL MEDICINE

## 2019-10-18 PROCEDURE — 2580000003 HC RX 258: Performed by: STUDENT IN AN ORGANIZED HEALTH CARE EDUCATION/TRAINING PROGRAM

## 2019-10-18 PROCEDURE — 93970 EXTREMITY STUDY: CPT

## 2019-10-18 PROCEDURE — 85025 COMPLETE CBC W/AUTO DIFF WBC: CPT

## 2019-10-18 PROCEDURE — 6360000002 HC RX W HCPCS: Performed by: STUDENT IN AN ORGANIZED HEALTH CARE EDUCATION/TRAINING PROGRAM

## 2019-10-18 PROCEDURE — 2060000000 HC ICU INTERMEDIATE R&B

## 2019-10-18 PROCEDURE — APPNB30 APP NON BILLABLE TIME 0-30 MINS: Performed by: NURSE PRACTITIONER

## 2019-10-18 PROCEDURE — 84100 ASSAY OF PHOSPHORUS: CPT

## 2019-10-18 PROCEDURE — 36415 COLL VENOUS BLD VENIPUNCTURE: CPT

## 2019-10-18 RX ORDER — FOLIC ACID 1 MG/1
1 TABLET ORAL 2 TIMES DAILY
Status: DISCONTINUED | OUTPATIENT
Start: 2019-10-18 | End: 2019-10-19 | Stop reason: HOSPADM

## 2019-10-18 RX ORDER — CLOPIDOGREL BISULFATE 75 MG/1
75 TABLET ORAL DAILY
Status: DISCONTINUED | OUTPATIENT
Start: 2019-10-18 | End: 2019-10-19 | Stop reason: HOSPADM

## 2019-10-18 RX ADMIN — CLOPIDOGREL 75 MG: 75 TABLET, FILM COATED ORAL at 16:29

## 2019-10-18 RX ADMIN — DOCUSATE SODIUM 100 MG: 100 CAPSULE, LIQUID FILLED ORAL at 09:53

## 2019-10-18 RX ADMIN — DOCUSATE SODIUM 100 MG: 100 CAPSULE, LIQUID FILLED ORAL at 20:18

## 2019-10-18 RX ADMIN — ASPIRIN 81 MG: 81 TABLET, CHEWABLE ORAL at 09:53

## 2019-10-18 RX ADMIN — INSULIN LISPRO 6 UNITS: 100 INJECTION, SOLUTION INTRAVENOUS; SUBCUTANEOUS at 13:42

## 2019-10-18 RX ADMIN — METOPROLOL TARTRATE 12.5 MG: 25 TABLET ORAL at 17:04

## 2019-10-18 RX ADMIN — INSULIN LISPRO 5 UNITS: 100 INJECTION, SOLUTION INTRAVENOUS; SUBCUTANEOUS at 20:19

## 2019-10-18 RX ADMIN — INSULIN LISPRO 15 UNITS: 100 INJECTION, SOLUTION INTRAVENOUS; SUBCUTANEOUS at 16:49

## 2019-10-18 RX ADMIN — SODIUM CHLORIDE, PRESERVATIVE FREE 10 ML: 5 INJECTION INTRAVENOUS at 20:19

## 2019-10-18 RX ADMIN — FOLIC ACID 1 MG: 1 TABLET ORAL at 20:47

## 2019-10-18 RX ADMIN — LISINOPRIL AND HYDROCHLOROTHIAZIDE 1 TABLET: 12.5; 2 TABLET ORAL at 09:53

## 2019-10-18 RX ADMIN — INSULIN GLARGINE 20 UNITS: 100 INJECTION, SOLUTION SUBCUTANEOUS at 09:53

## 2019-10-18 RX ADMIN — INSULIN LISPRO 6 UNITS: 100 INJECTION, SOLUTION INTRAVENOUS; SUBCUTANEOUS at 09:54

## 2019-10-18 RX ADMIN — ATORVASTATIN CALCIUM 80 MG: 80 TABLET, FILM COATED ORAL at 20:19

## 2019-10-18 RX ADMIN — LISINOPRIL AND HYDROCHLOROTHIAZIDE 1 TABLET: 12.5; 2 TABLET ORAL at 20:19

## 2019-10-18 ASSESSMENT — PAIN SCALES - GENERAL
PAINLEVEL_OUTOF10: 0

## 2019-10-19 VITALS
TEMPERATURE: 98.3 F | HEIGHT: 72 IN | BODY MASS INDEX: 29.8 KG/M2 | WEIGHT: 220 LBS | DIASTOLIC BLOOD PRESSURE: 79 MMHG | RESPIRATION RATE: 20 BRPM | SYSTOLIC BLOOD PRESSURE: 134 MMHG | HEART RATE: 84 BPM | OXYGEN SATURATION: 95 %

## 2019-10-19 LAB
ABSOLUTE EOS #: 0.77 K/UL (ref 0–0.44)
ABSOLUTE IMMATURE GRANULOCYTE: <0.03 K/UL (ref 0–0.3)
ABSOLUTE LYMPH #: 2.66 K/UL (ref 1.1–3.7)
ABSOLUTE MONO #: 0.89 K/UL (ref 0.1–1.2)
ANION GAP SERPL CALCULATED.3IONS-SCNC: 12 MMOL/L (ref 9–17)
BASOPHILS # BLD: 1 % (ref 0–2)
BASOPHILS ABSOLUTE: 0.07 K/UL (ref 0–0.2)
BUN BLDV-MCNC: 14 MG/DL (ref 8–23)
BUN/CREAT BLD: ABNORMAL (ref 9–20)
CALCIUM SERPL-MCNC: 9.6 MG/DL (ref 8.6–10.4)
CHLORIDE BLD-SCNC: 97 MMOL/L (ref 98–107)
CO2: 29 MMOL/L (ref 20–31)
CREAT SERPL-MCNC: 1.08 MG/DL (ref 0.7–1.2)
DIFFERENTIAL TYPE: ABNORMAL
EOSINOPHILS RELATIVE PERCENT: 10 % (ref 1–4)
GFR AFRICAN AMERICAN: >60 ML/MIN
GFR NON-AFRICAN AMERICAN: >60 ML/MIN
GFR SERPL CREATININE-BSD FRML MDRD: ABNORMAL ML/MIN/{1.73_M2}
GFR SERPL CREATININE-BSD FRML MDRD: ABNORMAL ML/MIN/{1.73_M2}
GLUCOSE BLD-MCNC: 190 MG/DL (ref 75–110)
GLUCOSE BLD-MCNC: 198 MG/DL (ref 70–99)
GLUCOSE BLD-MCNC: 276 MG/DL (ref 75–110)
HCT VFR BLD CALC: 53.7 % (ref 40.7–50.3)
HEMOGLOBIN: 17 G/DL (ref 13–17)
HOMOCYSTEINE: 9.4 UMOL/L
IMMATURE GRANULOCYTES: 0 %
LYMPHOCYTES # BLD: 36 % (ref 24–43)
MCH RBC QN AUTO: 28.3 PG (ref 25.2–33.5)
MCHC RBC AUTO-ENTMCNC: 31.7 G/DL (ref 28.4–34.8)
MCV RBC AUTO: 89.5 FL (ref 82.6–102.9)
MONOCYTES # BLD: 12 % (ref 3–12)
NRBC AUTOMATED: 0 PER 100 WBC
PDW BLD-RTO: 13.7 % (ref 11.8–14.4)
PLATELET # BLD: 189 K/UL (ref 138–453)
PLATELET ESTIMATE: ABNORMAL
PMV BLD AUTO: 10.9 FL (ref 8.1–13.5)
POTASSIUM SERPL-SCNC: 3.9 MMOL/L (ref 3.7–5.3)
RBC # BLD: 6 M/UL (ref 4.21–5.77)
RBC # BLD: ABNORMAL 10*6/UL
SEG NEUTROPHILS: 41 % (ref 36–65)
SEGMENTED NEUTROPHILS ABSOLUTE COUNT: 3.07 K/UL (ref 1.5–8.1)
SODIUM BLD-SCNC: 138 MMOL/L (ref 135–144)
WBC # BLD: 7.5 K/UL (ref 3.5–11.3)
WBC # BLD: ABNORMAL 10*3/UL

## 2019-10-19 PROCEDURE — 85025 COMPLETE CBC W/AUTO DIFF WBC: CPT

## 2019-10-19 PROCEDURE — 6370000000 HC RX 637 (ALT 250 FOR IP): Performed by: STUDENT IN AN ORGANIZED HEALTH CARE EDUCATION/TRAINING PROGRAM

## 2019-10-19 PROCEDURE — 85613 RUSSELL VIPER VENOM DILUTED: CPT

## 2019-10-19 PROCEDURE — 85306 CLOT INHIBIT PROT S FREE: CPT

## 2019-10-19 PROCEDURE — 80048 BASIC METABOLIC PNL TOTAL CA: CPT

## 2019-10-19 PROCEDURE — 85240 CLOT FACTOR VIII AHG 1 STAGE: CPT

## 2019-10-19 PROCEDURE — 83090 ASSAY OF HOMOCYSTEINE: CPT

## 2019-10-19 PROCEDURE — 81240 F2 GENE: CPT

## 2019-10-19 PROCEDURE — 81241 F5 GENE: CPT

## 2019-10-19 PROCEDURE — 2580000003 HC RX 258: Performed by: STUDENT IN AN ORGANIZED HEALTH CARE EDUCATION/TRAINING PROGRAM

## 2019-10-19 PROCEDURE — 99239 HOSP IP/OBS DSCHRG MGMT >30: CPT | Performed by: PSYCHIATRY & NEUROLOGY

## 2019-10-19 PROCEDURE — 36415 COLL VENOUS BLD VENIPUNCTURE: CPT

## 2019-10-19 PROCEDURE — 86147 CARDIOLIPIN ANTIBODY EA IG: CPT

## 2019-10-19 PROCEDURE — 85730 THROMBOPLASTIN TIME PARTIAL: CPT

## 2019-10-19 PROCEDURE — 85303 CLOT INHIBIT PROT C ACTIVITY: CPT

## 2019-10-19 PROCEDURE — 82947 ASSAY GLUCOSE BLOOD QUANT: CPT

## 2019-10-19 PROCEDURE — 6370000000 HC RX 637 (ALT 250 FOR IP): Performed by: INTERNAL MEDICINE

## 2019-10-19 PROCEDURE — 85610 PROTHROMBIN TIME: CPT

## 2019-10-19 RX ORDER — ATORVASTATIN CALCIUM 80 MG/1
80 TABLET, FILM COATED ORAL NIGHTLY
Qty: 30 TABLET | Refills: 3 | Status: SHIPPED | OUTPATIENT
Start: 2019-10-19

## 2019-10-19 RX ORDER — CLOPIDOGREL BISULFATE 75 MG/1
75 TABLET ORAL DAILY
Qty: 30 TABLET | Refills: 3 | Status: SHIPPED | OUTPATIENT
Start: 2019-10-19 | End: 2020-02-24 | Stop reason: ALTCHOICE

## 2019-10-19 RX ORDER — FOLIC ACID 1 MG/1
1 TABLET ORAL 2 TIMES DAILY
Qty: 30 TABLET | Refills: 3 | Status: SHIPPED | OUTPATIENT
Start: 2019-10-19

## 2019-10-19 RX ORDER — PRAVASTATIN SODIUM 40 MG
40 TABLET ORAL DAILY
COMMUNITY

## 2019-10-19 RX ORDER — TAMSULOSIN HYDROCHLORIDE 0.4 MG/1
0.4 CAPSULE ORAL DAILY
Status: DISCONTINUED | OUTPATIENT
Start: 2019-10-19 | End: 2019-10-19 | Stop reason: HOSPADM

## 2019-10-19 RX ORDER — TAMSULOSIN HYDROCHLORIDE 0.4 MG/1
0.4 CAPSULE ORAL DAILY
Qty: 30 CAPSULE | Refills: 3 | Status: SHIPPED | OUTPATIENT
Start: 2019-10-19

## 2019-10-19 RX ADMIN — INSULIN LISPRO 9 UNITS: 100 INJECTION, SOLUTION INTRAVENOUS; SUBCUTANEOUS at 13:20

## 2019-10-19 RX ADMIN — TAMSULOSIN HYDROCHLORIDE 0.4 MG: 0.4 CAPSULE ORAL at 10:09

## 2019-10-19 RX ADMIN — FOLIC ACID 1 MG: 1 TABLET ORAL at 10:09

## 2019-10-19 RX ADMIN — SODIUM CHLORIDE, PRESERVATIVE FREE 10 ML: 5 INJECTION INTRAVENOUS at 10:26

## 2019-10-19 RX ADMIN — ASPIRIN 81 MG: 81 TABLET, CHEWABLE ORAL at 10:09

## 2019-10-19 RX ADMIN — CLOPIDOGREL 75 MG: 75 TABLET, FILM COATED ORAL at 10:09

## 2019-10-19 RX ADMIN — INSULIN GLARGINE 20 UNITS: 100 INJECTION, SOLUTION SUBCUTANEOUS at 10:11

## 2019-10-19 RX ADMIN — METOPROLOL TARTRATE 12.5 MG: 25 TABLET ORAL at 10:10

## 2019-10-19 RX ADMIN — LISINOPRIL AND HYDROCHLOROTHIAZIDE 1 TABLET: 12.5; 2 TABLET ORAL at 10:09

## 2019-10-19 RX ADMIN — INSULIN LISPRO 3 UNITS: 100 INJECTION, SOLUTION INTRAVENOUS; SUBCUTANEOUS at 10:11

## 2019-10-19 ASSESSMENT — PAIN - FUNCTIONAL ASSESSMENT: PAIN_FUNCTIONAL_ASSESSMENT: 0-10

## 2019-10-19 ASSESSMENT — PAIN SCALES - GENERAL: PAINLEVEL_OUTOF10: 0

## 2019-10-22 LAB
ANTICARDIOLIPIN IGA ANTIBODY: 15.3 APU
ANTICARDIOLIPIN IGG ANTIBODY: 2.7 GPU
CARDIOLIPIN AB IGM: 4.7 MPU

## 2019-10-24 LAB
FACTOR V LEIDEN MUTATION: NORMAL
PROTHROMBIN G20210A MUTATION: NORMAL

## 2019-10-25 LAB
DILUTE RUSSELL VIPER VENOM TIME: NORMAL
FACTOR VIII ACTIVITY: 173 % (ref 50–150)
INR BLD: 1.1
PARTIAL THROMBOPLASTIN TIME: 29.6 SEC (ref 20.5–30.5)
PROTEIN C ACTIVITY: 130 %
PROTHROMBIN TIME: 11.8 SEC (ref 9–12)

## 2019-10-29 LAB — PROTEIN S ACTIVITY: 101 % (ref 77–116)

## 2020-02-24 ENCOUNTER — OFFICE VISIT (OUTPATIENT)
Dept: NEUROLOGY | Age: 65
End: 2020-02-24
Payer: MEDICARE

## 2020-02-24 VITALS
SYSTOLIC BLOOD PRESSURE: 111 MMHG | HEART RATE: 74 BPM | DIASTOLIC BLOOD PRESSURE: 68 MMHG | WEIGHT: 215.4 LBS | BODY MASS INDEX: 29.21 KG/M2

## 2020-02-24 PROCEDURE — G8419 CALC BMI OUT NRM PARAM NOF/U: HCPCS | Performed by: INTERNAL MEDICINE

## 2020-02-24 PROCEDURE — 99214 OFFICE O/P EST MOD 30 MIN: CPT | Performed by: INTERNAL MEDICINE

## 2020-02-24 PROCEDURE — 1036F TOBACCO NON-USER: CPT | Performed by: INTERNAL MEDICINE

## 2020-02-24 PROCEDURE — G8484 FLU IMMUNIZE NO ADMIN: HCPCS | Performed by: INTERNAL MEDICINE

## 2020-02-24 PROCEDURE — G8427 DOCREV CUR MEDS BY ELIG CLIN: HCPCS | Performed by: INTERNAL MEDICINE

## 2020-02-24 PROCEDURE — 3017F COLORECTAL CA SCREEN DOC REV: CPT | Performed by: INTERNAL MEDICINE

## 2020-02-24 ASSESSMENT — ENCOUNTER SYMPTOMS
ALLERGIC/IMMUNOLOGIC NEGATIVE: 1
GASTROINTESTINAL NEGATIVE: 1
RESPIRATORY NEGATIVE: 1
EYES NEGATIVE: 1

## 2020-02-24 NOTE — PROGRESS NOTES
[address] [place] [address], [facility] [address]  [address] [id number]  Dept: [phone]  Dept Fax: [phone]    NEUROLOGY FOLLOW UP NOTE                                              PATIENT NAME: Jose Manuel Dhillon   PATIENT MRN: [id number]  FOLLOW UP TODAY: 2/24/2020        INITIAL & INTERVAL HISTORY:     Jose Manuel Dhillon is a 62 y.o. male with below [address] who came here for follow up on stroke management. Patient refer feeling better and no more episodes of weakness, numbness, facial droop, aphasia. Refer that since discharge a nurse was following him for physical therapy and just finished last week. Upon further questioning wife also mentioned that he has not follow-up with PCP to start any anticoagulation. Patient was educated upon discharge last time to follow-up with primary (Dr. Nati Carbajal) to assess starting anticoagulation due to his positive history of hypercoagulability. Patient is on dual antiplatelet and if they wanted to start the correlation he will just need to discontinue Plavix. Admission Hx:   Patient with past because of hypertension, diabetes type 2, stroke x2 (2015 and 2019) who was admitted on 10/15/2019 after presenting to [facility] ED with acute onset of left-sided weakness, left facial droop, left hemiparesis, slurred speech. Patient was NIH of 8 and was not TPA window. Head CT was done and showed no hemorrhage or any hypodensity and TPA was given along with medications to control hypertension. Patient was transported to [facility] for evaluation. Patient overnight with improvement of symptoms. CTA head and neck showed no abnormalities. LDL was 216 and hemoglobin A1c was 12.3. TANVIR was done showed 40% ejection fraction and no other abnormalities. Brain MRI found right periventricular ischemia.    Further history was obtained from patient about patient being on warfarin but stopped medication and is unknown why he 150 mg by mouth every morning      pioglitazone (ACTOS) 30 MG tablet Take 30 mg by mouth daily      metFORMIN (GLUCOPHAGE-XR) 500 MG extended release tablet Take 500 mg by mouth 2 times daily      montelukast (SINGULAIR) 10 MG tablet Take 10 mg by mouth nightly       No current facility-administered medications for this visit. PREVIOUS WORKUP:    1. Head CT WO  Impression   No acute intracranial abnormality.       Multiple old infarcts as above.       Pansinusitis.       RECOMMENDATIONS:   The findings were sent to the Radiology Results Communication Center at 10:13   pm on 10/15/2019to be communicated to a licensed caregiver.  Discussed with   Dr. Vaughn Calderon at 10:14 p.m. .          2. CTA H/N  Impression   Multiple tandem stenoses right posterior cerebral artery.  Otherwise negative   CTA of the head and neck.       RECOMMENDATIONS:   The findings were sent to the Radiology Results [address] at 10:48   pm on 10/15/2019to be communicated to a licensed caregiver. Maikel Topete discussed   with Dr. Vaughn Calderon at 10:50 p.m. .          3.    Repeated Head CT (after tPA)  Impression   1. No evidence of an acute evolving infarct. 2. No acute intracranial hemorrhage or global mass effect. 3. Redemonstration of multifocal remote infarcts.          4.    Brain MRI WO  Impression   1. Small area of restricted diffusion within right periventricular white   matter and right basal ganglia, most compatible with acute infarcts.       2. Encephalomalacia and gliosis within left parietal lobe with hemosiderin   deposits. This is most likely sequela of chronic infarct.       3. Chronic lacunar infarcts within bilateral basal ganglia. Chronic small   vessel ischemic white matter disease and diffuse cerebral volume loss.       4. Extensive paranasal sinus disease, as detailed above. Correlate with   symptoms of sinusitis.       5. Nonspecific bilateral mastoid opacification.  Small amount of fluid within   the left petrous SYSTEMS:     Review of Systems   Constitutional: Negative. HENT: Negative. Eyes: Negative. Respiratory: Negative. Cardiovascular: Negative. Gastrointestinal: Negative. Endocrine: Negative. Genitourinary: Negative. Musculoskeletal: Negative. Skin: Negative. Allergic/Immunologic: Negative. Neurological: Positive for weakness. Hematological: Negative. Psychiatric/Behavioral: Negative. VITALS  /68 (Site: Right Upper Arm, Position: Sitting, Cuff Size: Large Adult)   Pulse 74   Wt 215 lb 6.4 oz (97.7 kg)   BMI 29.21 kg/m²     PHYSICAL EXAMINATION:     Physical Exam     General appearance: cooperative  Skin: no rash or skin lesions. HEENT: normocephalic  Optic Fundi: deferred  Neck: supple, no cervcical adenopathy or carotid bruit  Lungs: clear to auscultation  Heart: Regular rate and rhythm, normal S1, S2. No murmurs, clicks or gallops.   Peripheral pulses: radial pulses palpable  Abdominal: BS present, soft, NT, ND  Extremities: no edema    NEUROLOGICAL EXAMINATION:      GENERAL  Appears comfortable and in no distress   HEENT  NC/ AT   HEART  S1 and S2 heard; palpation of pulses: radial pulse    NECK  Supple and no bruits heard   MENTAL STATUS:  Alert, oriented, intact memory, no confusion, normal speech, normal language, no hallucination or delusion   CRANIAL NERVES: II     -      Visual fields intact to confrontation  III,IV,VI -  PERR, EOMs full, no ptosis  V     -     Normal facial sensation   VII    -     Normal facial symmetry  VIII   -     Intact hearing   IX,X -     Symmetrical palate  XI    -     Symmetrical shoulder shrug  XII   -     Midline tongue, no atrophy    MOTOR FUNCTION: RUE: Significant for good strength of grade 5/5 in proximal and distal muscle groups   LUE: Significant for good strength of grade 5/5 in proximal and distal muscle groups   RLE: Significant for good strength of grade 5/5 in proximal and distal muscle groups   LLE: Significant for

## 2020-02-24 NOTE — PATIENT INSTRUCTIONS
PLAN  1. Stop Clopidogrel (Plavix)  2. Rivaroxaban 20mg PO D  3. Follow up with Dr Miranda Farris in 1 week  4.  Follow up with General Neurology in 3-4 months

## 2020-06-15 ENCOUNTER — OFFICE VISIT (OUTPATIENT)
Dept: NEUROLOGY | Age: 65
End: 2020-06-15
Payer: COMMERCIAL

## 2020-06-15 VITALS
HEART RATE: 58 BPM | DIASTOLIC BLOOD PRESSURE: 94 MMHG | WEIGHT: 218 LBS | OXYGEN SATURATION: 98 % | HEIGHT: 72 IN | BODY MASS INDEX: 29.53 KG/M2 | TEMPERATURE: 97.3 F | SYSTOLIC BLOOD PRESSURE: 171 MMHG

## 2020-06-15 PROCEDURE — G8427 DOCREV CUR MEDS BY ELIG CLIN: HCPCS | Performed by: INTERNAL MEDICINE

## 2020-06-15 PROCEDURE — G8419 CALC BMI OUT NRM PARAM NOF/U: HCPCS | Performed by: INTERNAL MEDICINE

## 2020-06-15 PROCEDURE — 2022F DILAT RTA XM EVC RTNOPTHY: CPT | Performed by: INTERNAL MEDICINE

## 2020-06-15 PROCEDURE — 1036F TOBACCO NON-USER: CPT | Performed by: INTERNAL MEDICINE

## 2020-06-15 PROCEDURE — 99214 OFFICE O/P EST MOD 30 MIN: CPT | Performed by: INTERNAL MEDICINE

## 2020-06-15 PROCEDURE — 3017F COLORECTAL CA SCREEN DOC REV: CPT | Performed by: INTERNAL MEDICINE

## 2020-06-15 PROCEDURE — 3046F HEMOGLOBIN A1C LEVEL >9.0%: CPT | Performed by: INTERNAL MEDICINE

## 2020-06-15 NOTE — PROGRESS NOTES
ischemia. Further history was obtained from patient about patient being on warfarin but stopped medication and is unknown why he stopped it. His PCP was called and it was found that patient had DVTs on right upper extremity and left lower extremity on September 2018 and was started on Xarelto and further evaluation of hypercoagulability it was found to be positive and he was switched to warfarin. It was found that his pharmacy still had multiple refills of warfarin but patient was not taking it. Upon evaluation when wife patient is on know why he stopped medication. Upon discharge she was educated about condition but was also educated to follow-up with primary for evaluation of anticoagulation. He was placed on dual antiplatelet and statin but if primary decided to start anticoagulation he could discontinue clopidogrel. PMH    has a past medical history of CVA (cerebral vascular accident) ([facility] [address] Diabetes mellitus type 2, uncontrolled ([facility] [address] and HTN (hypertension). PSH/SH/FMH: Remain unchanged since last visit Visit date not found.     ALLERGIES:   Allergies   Allergen Reactions    Penicillins Hives and Other (See Comments)    Labetalol        MEDICATIONS:   Current Outpatient Medications   Medication Sig Dispense Refill    rivaroxaban (XARELTO) 20 MG TABS tablet Take 1 tablet by mouth daily (with breakfast) 30 tablet 2    atorvastatin (LIPITOR) 80 MG tablet Take 1 tablet by mouth nightly 30 tablet 3    folic acid (FOLVITE) 1 MG tablet Take 1 tablet by mouth 2 times daily 30 tablet 3    tamsulosin (FLOMAX) 0.4 MG capsule Take 1 capsule by mouth daily 30 capsule 3    metoprolol tartrate (LOPRESSOR) 25 MG tablet Take 0.5 tablets by mouth 2 times daily 60 tablet 3    pravastatin (PRAVACHOL) 40 MG tablet Take 40 mg by mouth daily      lisinopril-hydrochlorothiazide (PRINZIDE;ZESTORETIC) 20-12.5 MG per tablet Take 1 tablet by mouth 2 times daily      aspirin 81 MG chewable tablet Take 81 mg by

## 2020-07-06 RX ORDER — RIVAROXABAN 20 MG/1
TABLET, FILM COATED ORAL
Qty: 30 TABLET | Refills: 0 | Status: SHIPPED | OUTPATIENT
Start: 2020-07-06 | End: 2020-10-21

## 2020-07-06 NOTE — TELEPHONE ENCOUNTER
Pharmacy requesting refill of rivaroxaban (XARELTO) 20 MG     Date of last fill: 2.24.2020    Date of next follow up appointment: 12.14.2020    Pt notified response time for refills is 24-48 hours

## 2020-12-14 ENCOUNTER — OFFICE VISIT (OUTPATIENT)
Dept: NEUROLOGY | Age: 65
End: 2020-12-14
Payer: COMMERCIAL

## 2020-12-14 VITALS
DIASTOLIC BLOOD PRESSURE: 81 MMHG | HEART RATE: 84 BPM | HEIGHT: 72 IN | BODY MASS INDEX: 29.53 KG/M2 | TEMPERATURE: 96 F | OXYGEN SATURATION: 96 % | WEIGHT: 218 LBS | SYSTOLIC BLOOD PRESSURE: 168 MMHG

## 2020-12-14 PROCEDURE — G8427 DOCREV CUR MEDS BY ELIG CLIN: HCPCS | Performed by: PSYCHIATRY & NEUROLOGY

## 2020-12-14 PROCEDURE — 3017F COLORECTAL CA SCREEN DOC REV: CPT | Performed by: PSYCHIATRY & NEUROLOGY

## 2020-12-14 PROCEDURE — 2022F DILAT RTA XM EVC RTNOPTHY: CPT | Performed by: PSYCHIATRY & NEUROLOGY

## 2020-12-14 PROCEDURE — 3046F HEMOGLOBIN A1C LEVEL >9.0%: CPT | Performed by: PSYCHIATRY & NEUROLOGY

## 2020-12-14 PROCEDURE — G8484 FLU IMMUNIZE NO ADMIN: HCPCS | Performed by: PSYCHIATRY & NEUROLOGY

## 2020-12-14 PROCEDURE — G8419 CALC BMI OUT NRM PARAM NOF/U: HCPCS | Performed by: PSYCHIATRY & NEUROLOGY

## 2020-12-14 PROCEDURE — 99215 OFFICE O/P EST HI 40 MIN: CPT | Performed by: PSYCHIATRY & NEUROLOGY

## 2020-12-14 PROCEDURE — 1036F TOBACCO NON-USER: CPT | Performed by: PSYCHIATRY & NEUROLOGY

## 2020-12-14 RX ORDER — AMLODIPINE BESYLATE 10 MG/1
1 TABLET ORAL DAILY
COMMUNITY
Start: 2020-12-09

## 2020-12-14 NOTE — PATIENT INSTRUCTIONS
1. Check blood pressure, sugar at home daily  2. Continue Aspirin, get blood work done. Continue lipitor for now until level out  3. Fall precaution   4.  Refer to cardiology for loop recorder checking    Return in 8-10 months    Jackson Jon MD, MS

## 2020-12-14 NOTE — PROGRESS NOTES
[facility]            [facility], [address]; [facility], [address]  [address], [address], [place], [address]    [address] [facility], [address], [address] [facility]            Dept: [phone]          Dept Fax: [phone]             MD Avila Sen, MD Johan Poe MD Karrie Edelman, MD Pauletta Mill, CNP               NEUROLOGY FOLLOW UP NOTE                                          PATIENT NAME: Pearl Beebe   PATIENT MRN: [id number]  FOLLOW UP TODAY: 12/14/2020     Dear Dr. Daysi Spaulding MD,     I had the pleasure of seeing your patient Pearl Beebe, who comes for follow up. CHIEF COMPLAINT: 6 Month Follow-Up     INITIAL & INTERVAL HISTORY:     Nimesh Murdock is a 60year old AAM, last seen in neurology clinic on 6/15/2020 by Dr. Emmanuel Noland with resident Dr. Fidel Kemp, patient returns for follow up regarding his stroke. Patient came with wife to clinic today. He had stroke about 5 years ago, also last year. It was in right perientricular and basal ganglia, with residual left sided weakness. Last visit ordered lipid panel, A1c but pt has not got them done yet. He currently is on ASA 81mg, Xarelto 20mg, also on lipitor 80mg daily. Risk factors for stroke: HTN on multiple NP meds but pt does not check BP at home. Very poorly controlled DM with A1c 12.6 in 2019. he also does not check BG daily at home. He has elevated Factor VIII level, on Xarelto. Cardiomyopathy with EF 40%. Pt has loop recorder but battery dead, pt wishes to take it out. NEUROLOGICAL TESTS  MRI 10/17/2019  1.  Small area of restricted diffusion within right periventricular white   matter and right basal ganglia, most compatible with acute infarcts.     2. Encephalomalacia and gliosis within left parietal lobe with hemosiderin   deposits. This is most likely sequela of chronic infarct.       3. Chronic lacunar infarcts within bilateral basal ganglia. Chronic small   vessel ischemic white matter disease and diffuse cerebral volume loss.       4. Extensive paranasal sinus disease, as detailed above. Correlate with   symptoms of sinusitis.       5. Nonspecific bilateral mastoid opacification. Small amount of fluid within   the left petrous apex. Correlate with symptoms of mastoiditis. CTA head, neck 10/15/2019  Multiple tandem stenoses right posterior cerebral artery.  Otherwise negative   CTA of the head and neck. A1c 12.3 (10/16/2019)->pending   (10/16/2019)->pending  EF 40%    PMH/PSH/SH/FMH: Remain unchanged since last visit except those listed in the interval history.        Diagnosis Date    CVA (cerebral vascular accident) ([facility] [address] 2015    Diabetes mellitus type 2, uncontrolled ([facility] [address]     HTN (hypertension)         ALLERGIES:   Allergies   Allergen Reactions    Penicillins Hives and Other (See Comments)    Labetalol      MEDICATIONS:   Current Outpatient Medications   Medication Sig Dispense Refill    amLODIPine (NORVASC) 10 MG tablet Take 1 tablet by mouth daily      XARELTO 20 MG TABS tablet Take 1 tablet by mouth once daily with breakfast 30 tablet 0    atorvastatin (LIPITOR) 80 MG tablet Take 1 tablet by mouth nightly 30 tablet 3    folic acid (FOLVITE) 1 MG tablet Take 1 tablet by mouth 2 times daily 30 tablet 3    tamsulosin (FLOMAX) 0.4 MG capsule Take 1 capsule by mouth daily 30 capsule 3    metoprolol tartrate (LOPRESSOR) 25 MG tablet Take 0.5 tablets by mouth 2 times daily 60 tablet 3    pravastatin (PRAVACHOL) 40 MG tablet Take 40 mg by mouth daily      lisinopril-hydrochlorothiazide (PRINZIDE;ZESTORETIC) 20-12.5 MG per tablet Take 1 tablet by mouth 2 times daily  aspirin 81 MG chewable tablet Take 81 mg by mouth daily      Lansoprazole (PREVACID 24HR PO) Take 40 mg by mouth daily      glipiZIDE (GLUCOTROL) 10 MG tablet Take 10 mg by mouth 2 times daily (before meals)      buPROPion (WELLBUTRIN XL) 150 MG extended release tablet Take 150 mg by mouth every morning      pioglitazone (ACTOS) 30 MG tablet Take 30 mg by mouth daily      metFORMIN (GLUCOPHAGE-XR) 500 MG extended release tablet Take 500 mg by mouth 2 times daily      montelukast (SINGULAIR) 10 MG tablet Take 10 mg by mouth nightly       No current facility-administered medications for this visit. LABS & TESTS:      Lab Results   Component Value Date    WBC 7.5 10/19/2019    HGB 17.0 10/19/2019    HCT 53.7 (H) 10/19/2019    MCV 89.5 10/19/2019     10/19/2019       REVIEW OF SYSTEMS:       All items selected indicate a positive finding. Those items not selected are negative.   Constitutional [] Weight loss/gain   [] Fatigue  [] Fever/Chills   HEENT [] Hearing Loss  [] Visual Disturbance  [] Tinnitus  [] Eye pain   Respiratory [] Shortness of Breath  [] Cough  [] Snoring   Cardiovascular [] Chest Pain  [] Palpitations  [] Lightheaded   GI [] Constipation  [] Diarrhea  [] Swallowing change  [] Nausea/vomiting    [] Urinary Frequency  [] Urinary Urgency   Musculoskeletal [] Neck pain  [x] Back pain  [] Muscle pain  [] Restless legs   Dermatologic [] Skin changes   Neurologic [] Memory loss/confusion  [] Seizures  [x] Trouble walking or imbalance  [] Dizziness  [] Sleep disturbance  [x] Weakness  [] Numbness  [] Tremors  [] Speech Difficulty  [] Headaches  [] Light Sensitivity  [] Sound Sensitivity   Endocrinology []Excessive thirst  []Excessive hunger   Psychiatric [] Anxiety/Depression  [] Hallucination   Allergy/immunology []Hives/environmental allergies   Hematologic/lymph [] Abnormal bleeding  [] Abnormal bruising       VITALS BP (!) 147/82 (Site: Left Upper Arm, Position: Sitting, Cuff Size: Medium Adult)   Pulse 89   Temp 96 °F (35.6 °C) (Temporal)   Ht 6' (1.829 m)   Wt 218 lb (98.9 kg)   SpO2 96%   BMI 29.57 kg/m²       PHYSICAL EXAMINATION:       General Appearance:  Alert, cooperative, no signs of distress, appears stated age   Skin:  No rash or lesions   HEENT:  Normocephalic, conjunctiva/corneas clear; eyelids intact   Ears:  Normal external ear and canals   Nose: Nares normal, mucosa normal, no drainage    Throat: Lips and tongue normal; teeth normal;  gums normal   Neck: Supple, intact flexion, extension and rotation;   trachea midline;  no adenopathy;   thyroid: not enlarged;   no carotid pulse abnormality   Lungs:   Respirations unlabored   Heart: Regular rate and rhythm   Abdominal: BS present, soft, NT, ND   Extremities: No cyanosis, no edema   Psych Normal affect        NEUROLOGICAL EXAMINATION:     Mental status    Awake, alert and oriented; no aphasia, no dysarthria      Cranial nerves    II - visual fields intact to confrontation                                                III, IV, VI  PERRLA, EOMI, no pupillary defect; no ELVI, no nystagmus, no ptosis   V - normal facial sensation                                                               VII - normal facial symmetry                                                             VIII - intact hearing                                                                             IX, X - symmetrical palate                                                                  XI - symmetrical shoulder shrug                                                       XII - midline tongue without atrophy or fasciculation      Motor function  No abnormal movements; tone and bulk okay  Muscle strength:   RUE: delta 5/5, biceps 5/5, triceps 5/5,  5/5  LUE: delta 4/5, biceps 4/5, triceps 4/5,  4/5  RLE: hf 5/5, ke 5/5, kf 5/5, df 5/5, pf 5/5 LLE: hf 5/5, ke 5/5, kf 5/5, df 5/5, pf 5/5     Coordination FNF no dysmetria, heel to shin okay, VALERY okay, negative Romberg      Reflex function Trace in UEs, could not induce in LEs. Negative Babinski      Gait                  Slightly limp on left, Tandem deferred      Sensory function Intact to light touch/temp/pp/vibration in bilateral upper and lower extremities. Intact joint position sense, no extinction     ASSESSMENT:    1. History of Ischemic stroke  2. Poorly controlled DM  3. HTN  4. Factor VIII elevation   5. S/p loop recorder     PLANS:    1. Continue ASA and Xarelto  2. Follow with hem/onc  3. Blood work  4. Adjust lipitor dose according to lab result  5. Exercise as tolerated, fall precaution   6. Advised pt to check BP, BG daily at home  7. Refer to cardiology for loop recorder evaluation and removal per pt wish.        RTC in 8-10 months with resident      Jenniferchristy Velázquez MD, MS

## 2023-10-27 ENCOUNTER — HOSPITAL ENCOUNTER
Dept: HOSPITAL 101 - LAB | Age: 68
Discharge: HOME | End: 2023-10-27
Payer: COMMERCIAL

## 2023-10-27 DIAGNOSIS — E55.9: ICD-10-CM

## 2023-10-27 DIAGNOSIS — E66.9: ICD-10-CM

## 2023-10-27 DIAGNOSIS — Z79.899: ICD-10-CM

## 2023-10-27 DIAGNOSIS — I10: ICD-10-CM

## 2023-10-27 DIAGNOSIS — E11.65: Primary | ICD-10-CM

## 2023-10-27 LAB
ADD MANUAL DIFF: NO
ALANINE AMINOTRANSFERASE: 35 U/L (ref 16–63)
ALBUMIN GLOBULIN RATIO: 1.1
ALBUMIN LEVEL: 4.1 G/DL (ref 3.4–5)
ALKALINE PHOSPHATASE: 96 U/L (ref 46–116)
ANION GAP: 13.3
ASPARTATE AMINO TRANSFERASE: 17 U/L (ref 15–37)
BLOOD UREA NITROGEN: 19 MG/DL (ref 7–18)
CALCIUM: 9.3 MG/DL (ref 8.5–10.1)
CARBON DIOXIDE: 29.7 MMOL/L (ref 21–32)
CHLORIDE: 95 MMOL/L (ref 98–107)
CHOLESTEROL: 113 MG/DL (ref ?–200)
CO2 BLD-SCNC: 29.7 MMOL/L (ref 21–32)
ESTIMATED GFR (AFRICAN AMERICA: >60 (ref 60–?)
ESTIMATED GFR (NON-AFRICAN AME: 56 (ref 60–?)
GLOBULIN: 3.9 G/DL
GLUCOSE BLD-MCNC: 395 MG/DL (ref 74–106)
HCT VFR BLD CALC: 42.4 % (ref 42–54)
HDL CHOLESTEROL: 46 MG/DL (ref 40–60)
HEMATOCRIT: 42.4 % (ref 42–54)
HEMOGLOBIN: 14.3 G/DL (ref 14–18)
IMMATURE GRANULOCYTES ABS AUTO: 0.01 10^3/UL (ref 0–0.03)
IMMATURE GRANULOCYTES PCT AUTO: 0.2 % (ref 0–0.5)
LYMPHOCYTES  ABSOLUTE AUTO: 2.1 10^3/UL (ref 1.2–3.8)
MCV RBC: 85.1 FL (ref 80–94)
MEAN CORPUSCULAR HEMOGLOBIN: 28.7 PG (ref 25.9–34)
MEAN CORPUSCULAR HGB CONC: 33.7 G/DL (ref 29.9–35.2)
MEAN CORPUSCULAR VOLUME: 85.1 FL (ref 80–94)
PLATELET # BLD: 173 10^3/UL (ref 150–450)
PLATELET COUNT: 173 10^3/UL (ref 150–450)
POTASSIUM SERPLBLD-SCNC: 4 MMOL/L (ref 3.5–5.1)
POTASSIUM: 4 MMOL/L (ref 3.5–5.1)
RED BLOOD COUNT: 4.98 10^6/UL (ref 4.7–6.1)
SODIUM BLD-SCNC: 134 MMOL/L (ref 136–145)
SODIUM: 134 MMOL/L (ref 136–145)
THYROID STIMULATING HORMONE: 1.08 UIU/ML (ref 0.36–3.74)
TOTAL PROTEIN: 8 G/DL (ref 6.4–8.2)
TRIGLYCERIDES: 96 MG/DL (ref ?–150)
VLDL CHOLESTEROL: 19.2 MG/DL
WBC # BLD: 5.9 10^3/UL (ref 4–11)
WHITE BLOOD COUNT: 5.9 10^3/UL (ref 4–11)

## 2023-10-27 PROCEDURE — 36415 COLL VENOUS BLD VENIPUNCTURE: CPT

## 2023-10-27 PROCEDURE — 82306 VITAMIN D 25 HYDROXY: CPT

## 2023-10-27 PROCEDURE — 83036 HEMOGLOBIN GLYCOSYLATED A1C: CPT

## 2023-10-27 PROCEDURE — 80048 BASIC METABOLIC PNL TOTAL CA: CPT

## 2023-10-27 PROCEDURE — 85025 COMPLETE CBC W/AUTO DIFF WBC: CPT

## 2023-10-27 PROCEDURE — 80061 LIPID PANEL: CPT

## 2023-10-27 PROCEDURE — 80076 HEPATIC FUNCTION PANEL: CPT

## 2023-10-27 PROCEDURE — 82043 UR ALBUMIN QUANTITATIVE: CPT

## 2023-10-27 PROCEDURE — 84443 ASSAY THYROID STIM HORMONE: CPT

## 2023-11-25 ENCOUNTER — HOSPITAL ENCOUNTER (OUTPATIENT)
Dept: HOSPITAL 101 - ER | Age: 68
Setting detail: OBSERVATION
LOS: 1 days | Discharge: HOME | End: 2023-11-26
Payer: COMMERCIAL

## 2023-11-25 VITALS — OXYGEN SATURATION: 98 % | HEART RATE: 81 BPM | RESPIRATION RATE: 16 BRPM

## 2023-11-25 VITALS
RESPIRATION RATE: 14 BRPM | HEART RATE: 79 BPM | SYSTOLIC BLOOD PRESSURE: 111 MMHG | DIASTOLIC BLOOD PRESSURE: 58 MMHG | OXYGEN SATURATION: 100 %

## 2023-11-25 VITALS
HEART RATE: 79 BPM | TEMPERATURE: 98.42 F | DIASTOLIC BLOOD PRESSURE: 60 MMHG | RESPIRATION RATE: 16 BRPM | OXYGEN SATURATION: 98 % | SYSTOLIC BLOOD PRESSURE: 130 MMHG

## 2023-11-25 VITALS — HEART RATE: 80 BPM | OXYGEN SATURATION: 97 %

## 2023-11-25 VITALS — RESPIRATION RATE: 16 BRPM | HEART RATE: 73 BPM | OXYGEN SATURATION: 98 %

## 2023-11-25 VITALS
SYSTOLIC BLOOD PRESSURE: 184 MMHG | TEMPERATURE: 98.6 F | RESPIRATION RATE: 13 BRPM | OXYGEN SATURATION: 100 % | DIASTOLIC BLOOD PRESSURE: 90 MMHG | HEART RATE: 70 BPM

## 2023-11-25 VITALS — BODY MASS INDEX: 39.8 KG/M2 | BODY MASS INDEX: 25.5 KG/M2

## 2023-11-25 VITALS — HEART RATE: 75 BPM | RESPIRATION RATE: 14 BRPM | OXYGEN SATURATION: 99 %

## 2023-11-25 VITALS — HEART RATE: 78 BPM | RESPIRATION RATE: 14 BRPM

## 2023-11-25 VITALS — OXYGEN SATURATION: 100 % | RESPIRATION RATE: 18 BRPM | HEART RATE: 82 BPM

## 2023-11-25 DIAGNOSIS — I25.2: ICD-10-CM

## 2023-11-25 DIAGNOSIS — F01.50: ICD-10-CM

## 2023-11-25 DIAGNOSIS — E78.00: ICD-10-CM

## 2023-11-25 DIAGNOSIS — Z79.84: ICD-10-CM

## 2023-11-25 DIAGNOSIS — Z95.818: ICD-10-CM

## 2023-11-25 DIAGNOSIS — Z79.899: ICD-10-CM

## 2023-11-25 DIAGNOSIS — I67.9: ICD-10-CM

## 2023-11-25 DIAGNOSIS — Z86.73: ICD-10-CM

## 2023-11-25 DIAGNOSIS — I10: ICD-10-CM

## 2023-11-25 DIAGNOSIS — R53.1: ICD-10-CM

## 2023-11-25 DIAGNOSIS — Z79.4: ICD-10-CM

## 2023-11-25 DIAGNOSIS — N40.0: ICD-10-CM

## 2023-11-25 DIAGNOSIS — R55: ICD-10-CM

## 2023-11-25 DIAGNOSIS — C61: ICD-10-CM

## 2023-11-25 DIAGNOSIS — E11.65: Primary | ICD-10-CM

## 2023-11-25 DIAGNOSIS — F10.20: ICD-10-CM

## 2023-11-25 DIAGNOSIS — G47.30: ICD-10-CM

## 2023-11-25 LAB
ADD MANUAL DIFF: NO
ALANINE AMINOTRANSFERASE: 47 U/L (ref 16–63)
ALBUMIN GLOBULIN RATIO: 1
ALBUMIN LEVEL: 3.9 G/DL (ref 3.4–5)
ALKALINE PHOSPHATASE: 85 U/L (ref 46–116)
ANION GAP: 14
ASPARTATE AMINO TRANSFERASE: 23 U/L (ref 15–37)
BLOOD UREA NITROGEN: 18 MG/DL (ref 7–18)
CALCIUM: 8.8 MG/DL (ref 8.5–10.1)
CARBON DIOXIDE: 30.6 MMOL/L (ref 21–32)
CAST SEEN?: (no result) #/LPF
CHLORIDE: 95 MMOL/L (ref 98–107)
CO2 BLD-SCNC: 30.6 MMOL/L (ref 21–32)
ESTIMATED GFR (AFRICAN AMERICA: >60 (ref 60–?)
ESTIMATED GFR (NON-AFRICAN AME: >60 (ref 60–?)
GLOBULIN: 3.8 G/DL
GLUCOSE BLD-MCNC: 356 MG/DL (ref 74–106)
GLUCOSE URINE UA: >=1000 MG/DL
HCT VFR BLD CALC: 38 % (ref 42–54)
HEMATOCRIT: 38 % (ref 42–54)
HEMOGLOBIN: 12.2 G/DL (ref 14–18)
IMMATURE GRANULOCYTES ABS AUTO: 0.03 10^3/UL (ref 0–0.03)
IMMATURE GRANULOCYTES PCT AUTO: 0.6 % (ref 0–0.5)
INR: 0.97
LACTATE/LACTIC ACID: 1.6 MMOL/L (ref 0.4–2)
LACTATE/LACTIC ACID: 2.2 MMOL/L (ref 0.4–2)
LYMPHOCYTES  ABSOLUTE AUTO: 1.4 10^3/UL (ref 1.2–3.8)
MCV RBC: 87.8 FL (ref 80–94)
MEAN CORPUSCULAR HEMOGLOBIN: 28.2 PG (ref 25.9–34)
MEAN CORPUSCULAR HGB CONC: 32.1 G/DL (ref 29.9–35.2)
MEAN CORPUSCULAR VOLUME: 87.8 FL (ref 80–94)
PLATELET # BLD: 141 10^3/UL (ref 150–450)
PLATELET COUNT: 141 10^3/UL (ref 150–450)
POTASSIUM SERPLBLD-SCNC: 3.6 MMOL/L (ref 3.5–5.1)
POTASSIUM: 3.6 MMOL/L (ref 3.5–5.1)
PROTHROMBIN TIME: 10.3 SEC (ref 9–11.6)
RED BLOOD COUNT: 4.33 10^6/UL (ref 4.7–6.1)
SODIUM BLD-SCNC: 136 MMOL/L (ref 136–145)
SODIUM: 136 MMOL/L (ref 136–145)
THYROID STIMULATING HORMONE: 2.17 UIU/ML (ref 0.36–3.74)
TOTAL PROTEIN: 7.7 G/DL (ref 6.4–8.2)
URINE CULTURE INDICATED: NO
WBC # BLD: 5.2 10^3/UL (ref 4–11)
WHITE BLOOD COUNT: 5.2 10^3/UL (ref 4–11)

## 2023-11-25 PROCEDURE — 80048 BASIC METABOLIC PNL TOTAL CA: CPT

## 2023-11-25 PROCEDURE — 80053 COMPREHEN METABOLIC PANEL: CPT

## 2023-11-25 PROCEDURE — 82948 REAGENT STRIP/BLOOD GLUCOSE: CPT

## 2023-11-25 PROCEDURE — 93005 ELECTROCARDIOGRAM TRACING: CPT

## 2023-11-25 PROCEDURE — 99285 EMERGENCY DEPT VISIT HI MDM: CPT

## 2023-11-25 PROCEDURE — 83605 ASSAY OF LACTIC ACID: CPT

## 2023-11-25 PROCEDURE — 81001 URINALYSIS AUTO W/SCOPE: CPT

## 2023-11-25 PROCEDURE — G0378 HOSPITAL OBSERVATION PER HR: HCPCS

## 2023-11-25 PROCEDURE — 84484 ASSAY OF TROPONIN QUANT: CPT

## 2023-11-25 PROCEDURE — 85610 PROTHROMBIN TIME: CPT

## 2023-11-25 PROCEDURE — 70450 CT HEAD/BRAIN W/O DYE: CPT

## 2023-11-25 PROCEDURE — 85025 COMPLETE CBC W/AUTO DIFF WBC: CPT

## 2023-11-25 PROCEDURE — 82009 KETONE BODYS QUAL: CPT

## 2023-11-25 PROCEDURE — 84443 ASSAY THYROID STIM HORMONE: CPT

## 2023-11-25 PROCEDURE — 71046 X-RAY EXAM CHEST 2 VIEWS: CPT

## 2023-11-25 PROCEDURE — 36415 COLL VENOUS BLD VENIPUNCTURE: CPT

## 2023-11-25 RX ADMIN — OXYBUTYNIN CHLORIDE 5 MG: 5 TABLET, EXTENDED RELEASE ORAL at 23:41

## 2023-11-25 RX ADMIN — SODIUM CHLORIDE 75 ML: 450 INJECTION, SOLUTION INTRAVENOUS at 23:39

## 2023-11-26 VITALS — RESPIRATION RATE: 9 BRPM | HEART RATE: 67 BPM

## 2023-11-26 VITALS
TEMPERATURE: 97.8 F | SYSTOLIC BLOOD PRESSURE: 135 MMHG | RESPIRATION RATE: 18 BRPM | DIASTOLIC BLOOD PRESSURE: 65 MMHG | HEART RATE: 69 BPM | OXYGEN SATURATION: 97 %

## 2023-11-26 VITALS — HEART RATE: 59 BPM | RESPIRATION RATE: 11 BRPM

## 2023-11-26 VITALS — RESPIRATION RATE: 12 BRPM | HEART RATE: 63 BPM

## 2023-11-26 VITALS — HEART RATE: 71 BPM

## 2023-11-26 VITALS — HEART RATE: 65 BPM | RESPIRATION RATE: 12 BRPM

## 2023-11-26 VITALS — HEART RATE: 64 BPM

## 2023-11-26 VITALS — HEART RATE: 59 BPM | RESPIRATION RATE: 12 BRPM

## 2023-11-26 VITALS
SYSTOLIC BLOOD PRESSURE: 132 MMHG | HEART RATE: 62 BPM | OXYGEN SATURATION: 96 % | DIASTOLIC BLOOD PRESSURE: 72 MMHG | RESPIRATION RATE: 16 BRPM | TEMPERATURE: 97.9 F

## 2023-11-26 VITALS — RESPIRATION RATE: 11 BRPM | OXYGEN SATURATION: 96 % | HEART RATE: 58 BPM

## 2023-11-26 VITALS
DIASTOLIC BLOOD PRESSURE: 95 MMHG | SYSTOLIC BLOOD PRESSURE: 168 MMHG | OXYGEN SATURATION: 98 % | RESPIRATION RATE: 16 BRPM | HEART RATE: 75 BPM

## 2023-11-26 VITALS — RESPIRATION RATE: 5 BRPM | HEART RATE: 68 BPM

## 2023-11-26 VITALS — HEART RATE: 73 BPM | RESPIRATION RATE: 14 BRPM

## 2023-11-26 VITALS — RESPIRATION RATE: 26 BRPM | HEART RATE: 78 BPM

## 2023-11-26 VITALS — HEART RATE: 58 BPM | RESPIRATION RATE: 18 BRPM

## 2023-11-26 VITALS — RESPIRATION RATE: 19 BRPM | HEART RATE: 80 BPM

## 2023-11-26 VITALS — RESPIRATION RATE: 16 BRPM | HEART RATE: 58 BPM

## 2023-11-26 VITALS — HEART RATE: 68 BPM

## 2023-11-26 VITALS — HEART RATE: 70 BPM | RESPIRATION RATE: 15 BRPM

## 2023-11-26 VITALS — DIASTOLIC BLOOD PRESSURE: 89 MMHG | SYSTOLIC BLOOD PRESSURE: 162 MMHG | HEART RATE: 74 BPM

## 2023-11-26 VITALS — HEART RATE: 69 BPM | RESPIRATION RATE: 14 BRPM

## 2023-11-26 VITALS — HEART RATE: 60 BPM

## 2023-11-26 VITALS — HEART RATE: 63 BPM | RESPIRATION RATE: 11 BRPM

## 2023-11-26 VITALS — HEART RATE: 72 BPM | RESPIRATION RATE: 21 BRPM

## 2023-11-26 VITALS — HEART RATE: 67 BPM | RESPIRATION RATE: 12 BRPM

## 2023-11-26 VITALS — HEART RATE: 76 BPM | RESPIRATION RATE: 19 BRPM

## 2023-11-26 VITALS — RESPIRATION RATE: 10 BRPM | HEART RATE: 72 BPM

## 2023-11-26 VITALS — SYSTOLIC BLOOD PRESSURE: 161 MMHG | HEART RATE: 71 BPM | DIASTOLIC BLOOD PRESSURE: 84 MMHG

## 2023-11-26 VITALS — RESPIRATION RATE: 12 BRPM | HEART RATE: 61 BPM

## 2023-11-26 VITALS — OXYGEN SATURATION: 99 % | HEART RATE: 69 BPM | RESPIRATION RATE: 13 BRPM

## 2023-11-26 VITALS — HEART RATE: 67 BPM | RESPIRATION RATE: 17 BRPM

## 2023-11-26 VITALS — HEART RATE: 76 BPM

## 2023-11-26 VITALS — RESPIRATION RATE: 12 BRPM | HEART RATE: 58 BPM

## 2023-11-26 VITALS — HEART RATE: 58 BPM | RESPIRATION RATE: 11 BRPM

## 2023-11-26 VITALS — RESPIRATION RATE: 5 BRPM | HEART RATE: 69 BPM

## 2023-11-26 VITALS — HEART RATE: 62 BPM | DIASTOLIC BLOOD PRESSURE: 72 MMHG | SYSTOLIC BLOOD PRESSURE: 132 MMHG

## 2023-11-26 VITALS — RESPIRATION RATE: 20 BRPM | HEART RATE: 70 BPM

## 2023-11-26 VITALS — RESPIRATION RATE: 11 BRPM | HEART RATE: 65 BPM

## 2023-11-26 VITALS — RESPIRATION RATE: 15 BRPM | HEART RATE: 66 BPM

## 2023-11-26 VITALS — RESPIRATION RATE: 17 BRPM | HEART RATE: 74 BPM

## 2023-11-26 VITALS — HEART RATE: 79 BPM | RESPIRATION RATE: 15 BRPM

## 2023-11-26 VITALS — RESPIRATION RATE: 12 BRPM | HEART RATE: 64 BPM

## 2023-11-26 VITALS — HEART RATE: 57 BPM | RESPIRATION RATE: 15 BRPM

## 2023-11-26 VITALS — HEART RATE: 72 BPM

## 2023-11-26 VITALS — RESPIRATION RATE: 15 BRPM | HEART RATE: 58 BPM

## 2023-11-26 VITALS — DIASTOLIC BLOOD PRESSURE: 95 MMHG | HEART RATE: 72 BPM | SYSTOLIC BLOOD PRESSURE: 168 MMHG

## 2023-11-26 VITALS — HEART RATE: 70 BPM | RESPIRATION RATE: 16 BRPM

## 2023-11-26 VITALS — HEART RATE: 57 BPM | RESPIRATION RATE: 12 BRPM

## 2023-11-26 VITALS — HEART RATE: 63 BPM

## 2023-11-26 VITALS — HEART RATE: 67 BPM

## 2023-11-26 VITALS — HEART RATE: 69 BPM

## 2023-11-26 VITALS — RESPIRATION RATE: 13 BRPM | HEART RATE: 60 BPM

## 2023-11-26 LAB
ADD MANUAL DIFF: NO
ANION GAP: 10.5
BLOOD UREA NITROGEN: 17 MG/DL (ref 7–18)
CALCIUM: 8.5 MG/DL (ref 8.5–10.1)
CARBON DIOXIDE: 32.1 MMOL/L (ref 21–32)
CHLORIDE: 100 MMOL/L (ref 98–107)
CO2 BLD-SCNC: 32.1 MMOL/L (ref 21–32)
ESTIMATED GFR (AFRICAN AMERICA: >60 (ref 60–?)
ESTIMATED GFR (NON-AFRICAN AME: >60 (ref 60–?)
GLUCOSE BLD-MCNC: 216 MG/DL (ref 74–106)
HCT VFR BLD CALC: 32.4 % (ref 42–54)
HEMATOCRIT: 32.4 % (ref 42–54)
HEMOGLOBIN: 10.5 G/DL (ref 14–18)
IMMATURE GRANULOCYTES ABS AUTO: 0.02 10^3/UL (ref 0–0.03)
IMMATURE GRANULOCYTES PCT AUTO: 0.3 % (ref 0–0.5)
LYMPHOCYTES  ABSOLUTE AUTO: 1.8 10^3/UL (ref 1.2–3.8)
MCV RBC: 87.6 FL (ref 80–94)
MEAN CORPUSCULAR HEMOGLOBIN: 28.4 PG (ref 25.9–34)
MEAN CORPUSCULAR HGB CONC: 32.4 G/DL (ref 29.9–35.2)
MEAN CORPUSCULAR VOLUME: 87.6 FL (ref 80–94)
PLATELET # BLD: 127 10^3/UL (ref 150–450)
PLATELET COUNT: 127 10^3/UL (ref 150–450)
POTASSIUM SERPLBLD-SCNC: 3.6 MMOL/L (ref 3.5–5.1)
POTASSIUM: 3.6 MMOL/L (ref 3.5–5.1)
RED BLOOD COUNT: 3.7 10^6/UL (ref 4.7–6.1)
SODIUM BLD-SCNC: 139 MMOL/L (ref 136–145)
SODIUM: 139 MMOL/L (ref 136–145)
WBC # BLD: 5.7 10^3/UL (ref 4–11)
WHITE BLOOD COUNT: 5.7 10^3/UL (ref 4–11)

## 2023-11-26 RX ADMIN — Medication 50 MCG: at 08:48

## 2023-11-26 RX ADMIN — AMLODIPINE BESYLATE 10 MG: 5 TABLET ORAL at 08:48

## 2023-11-26 RX ADMIN — EZETIMIBE 10 MG: 10 TABLET ORAL at 08:47

## 2023-11-26 RX ADMIN — PIOGLITAZONE 30 MG: 15 TABLET ORAL at 08:47

## 2023-11-26 RX ADMIN — TAMSULOSIN HYDROCHLORIDE 0.4 MG: 0.4 CAPSULE ORAL at 08:47

## 2023-11-26 RX ADMIN — METFORMIN HYDROCHLORIDE 500 MG: 500 TABLET, EXTENDED RELEASE ORAL at 08:48

## 2023-11-26 RX ADMIN — MONTELUKAST 10 MG: 10 TABLET, FILM COATED ORAL at 08:49

## 2023-11-26 RX ADMIN — INSULIN ASPART 35 UNIT: 100 INJECTION, SUSPENSION SUBCUTANEOUS at 08:45

## 2023-11-26 RX ADMIN — GLIPIZIDE 10 MG: 10 TABLET ORAL at 08:49

## 2023-11-26 RX ADMIN — Medication 1 TAB: at 08:47

## 2023-11-26 RX ADMIN — LISINOPRIL AND HYDROCHLOROTHIAZIDE 1 TAB: 12.5; 2 TABLET ORAL at 08:49

## 2023-11-26 RX ADMIN — Medication 1250 MG: at 08:49

## 2023-11-26 RX ADMIN — METOPROLOL SUCCINATE 50 MG: 50 TABLET, EXTENDED RELEASE ORAL at 08:49

## 2024-02-08 ENCOUNTER — HOSPITAL ENCOUNTER
Dept: HOSPITAL 101 - LAB | Age: 69
Discharge: HOME | End: 2024-02-08
Payer: COMMERCIAL

## 2024-02-08 DIAGNOSIS — E11.65: Primary | ICD-10-CM

## 2024-02-08 DIAGNOSIS — Z79.4: ICD-10-CM

## 2024-02-08 PROCEDURE — 36415 COLL VENOUS BLD VENIPUNCTURE: CPT

## 2024-02-08 PROCEDURE — 83036 HEMOGLOBIN GLYCOSYLATED A1C: CPT

## 2024-05-09 ENCOUNTER — HOSPITAL ENCOUNTER
Dept: HOSPITAL 101 - LAB | Age: 69
Discharge: HOME | End: 2024-05-09
Payer: COMMERCIAL

## 2024-05-09 DIAGNOSIS — Z79.4: ICD-10-CM

## 2024-05-09 DIAGNOSIS — E11.65: Primary | ICD-10-CM

## 2024-05-09 PROCEDURE — 36415 COLL VENOUS BLD VENIPUNCTURE: CPT

## 2024-05-09 PROCEDURE — 83036 HEMOGLOBIN GLYCOSYLATED A1C: CPT

## 2024-07-22 ENCOUNTER — HOSPITAL ENCOUNTER (EMERGENCY)
Age: 69
LOS: 1 days | Discharge: ANOTHER HOSPITAL | End: 2024-07-23
Payer: COMMERCIAL

## 2024-07-22 VITALS — HEART RATE: 67 BPM | OXYGEN SATURATION: 99 %

## 2024-07-22 VITALS — OXYGEN SATURATION: 100 % | SYSTOLIC BLOOD PRESSURE: 142 MMHG | HEART RATE: 65 BPM | DIASTOLIC BLOOD PRESSURE: 81 MMHG

## 2024-07-22 VITALS — HEART RATE: 66 BPM | DIASTOLIC BLOOD PRESSURE: 66 MMHG | OXYGEN SATURATION: 98 % | SYSTOLIC BLOOD PRESSURE: 141 MMHG

## 2024-07-22 VITALS — HEART RATE: 62 BPM | OXYGEN SATURATION: 98 %

## 2024-07-22 VITALS — HEART RATE: 75 BPM | DIASTOLIC BLOOD PRESSURE: 66 MMHG | OXYGEN SATURATION: 97 % | SYSTOLIC BLOOD PRESSURE: 122 MMHG

## 2024-07-22 VITALS — HEART RATE: 63 BPM | OXYGEN SATURATION: 99 %

## 2024-07-22 VITALS — HEART RATE: 63 BPM | OXYGEN SATURATION: 100 %

## 2024-07-22 VITALS — OXYGEN SATURATION: 100 % | HEART RATE: 62 BPM

## 2024-07-22 VITALS — OXYGEN SATURATION: 99 % | HEART RATE: 76 BPM

## 2024-07-22 VITALS — BODY MASS INDEX: 33.9 KG/M2

## 2024-07-22 VITALS
TEMPERATURE: 98.6 F | OXYGEN SATURATION: 99 % | DIASTOLIC BLOOD PRESSURE: 69 MMHG | SYSTOLIC BLOOD PRESSURE: 127 MMHG | HEART RATE: 70 BPM

## 2024-07-22 VITALS — HEART RATE: 69 BPM | OXYGEN SATURATION: 97 %

## 2024-07-22 VITALS — OXYGEN SATURATION: 98 % | HEART RATE: 64 BPM

## 2024-07-22 VITALS — HEART RATE: 75 BPM | SYSTOLIC BLOOD PRESSURE: 153 MMHG | DIASTOLIC BLOOD PRESSURE: 83 MMHG

## 2024-07-22 VITALS — OXYGEN SATURATION: 99 % | HEART RATE: 72 BPM

## 2024-07-22 VITALS — HEART RATE: 70 BPM

## 2024-07-22 DIAGNOSIS — I21.4: Primary | ICD-10-CM

## 2024-07-22 LAB
ADD MANUAL DIFF: NO
ANION GAP: 8.7
BLOOD UREA NITROGEN: 21 MG/DL (ref 7–18)
CALCIUM: 9.2 MG/DL (ref 8.5–10.1)
CARBON DIOXIDE: 32.6 MMOL/L (ref 21–32)
CHLORIDE: 99 MMOL/L (ref 98–107)
CO2 BLD-SCNC: 32.6 MMOL/L (ref 21–32)
ESTIMATED GFR (AFRICAN AMERICA: >60 (ref 60–?)
ESTIMATED GFR (NON-AFRICAN AME: >60 (ref 60–?)
GLUCOSE BLD-MCNC: 190 MG/DL (ref 74–106)
HCT VFR BLD CALC: 37.6 % (ref 42–54)
HEMATOCRIT: 37.6 % (ref 42–54)
HEMOGLOBIN: 12.3 G/DL (ref 14–18)
IMMATURE GRANULOCYTES ABS AUTO: 0.02 10^3/UL (ref 0–0.03)
IMMATURE GRANULOCYTES PCT AUTO: 0.3 % (ref 0–0.5)
INR: 1.02
LYMPHOCYTES  ABSOLUTE AUTO: 2 10^3/UL (ref 1.2–3.8)
MCV RBC: 87.9 FL (ref 80–94)
MEAN CORPUSCULAR HEMOGLOBIN: 28.7 PG (ref 25.9–34)
MEAN CORPUSCULAR HGB CONC: 32.7 G/DL (ref 29.9–35.2)
MEAN CORPUSCULAR VOLUME: 87.9 FL (ref 80–94)
PARTIAL THROMBOPLASTIN TIME: 27.9 SEC (ref 22.3–36.2)
PLATELET # BLD: 224 10^3/UL (ref 150–450)
PLATELET COUNT: 224 10^3/UL (ref 150–450)
POTASSIUM SERPLBLD-SCNC: 5.3 MMOL/L (ref 3.5–5.1)
POTASSIUM: 5.3 MMOL/L (ref 3.5–5.1)
PROTHROMBIN TIME: 10.8 SEC (ref 9–11.6)
RED BLOOD COUNT: 4.28 10^6/UL (ref 4.7–6.1)
SODIUM BLD-SCNC: 135 MMOL/L (ref 136–145)
SODIUM: 135 MMOL/L (ref 136–145)
WBC # BLD: 6.5 10^3/UL (ref 4–11)
WHITE BLOOD COUNT: 6.5 10^3/UL (ref 4–11)

## 2024-07-22 PROCEDURE — 71045 X-RAY EXAM CHEST 1 VIEW: CPT

## 2024-07-22 PROCEDURE — 85610 PROTHROMBIN TIME: CPT

## 2024-07-22 PROCEDURE — 99285 EMERGENCY DEPT VISIT HI MDM: CPT

## 2024-07-22 PROCEDURE — 85025 COMPLETE CBC W/AUTO DIFF WBC: CPT

## 2024-07-22 PROCEDURE — 85730 THROMBOPLASTIN TIME PARTIAL: CPT

## 2024-07-22 PROCEDURE — 93005 ELECTROCARDIOGRAM TRACING: CPT

## 2024-07-22 PROCEDURE — 84484 ASSAY OF TROPONIN QUANT: CPT

## 2024-07-22 PROCEDURE — 36415 COLL VENOUS BLD VENIPUNCTURE: CPT

## 2024-07-22 PROCEDURE — 80048 BASIC METABOLIC PNL TOTAL CA: CPT

## 2024-07-22 PROCEDURE — 96374 THER/PROPH/DIAG INJ IV PUSH: CPT

## 2024-07-23 VITALS — HEART RATE: 59 BPM | OXYGEN SATURATION: 99 %

## 2024-07-23 VITALS — OXYGEN SATURATION: 99 % | HEART RATE: 59 BPM

## 2024-07-23 VITALS — OXYGEN SATURATION: 99 % | HEART RATE: 71 BPM

## 2024-07-23 VITALS — OXYGEN SATURATION: 100 % | HEART RATE: 61 BPM

## 2024-07-23 VITALS — HEART RATE: 65 BPM | OXYGEN SATURATION: 99 % | DIASTOLIC BLOOD PRESSURE: 74 MMHG | SYSTOLIC BLOOD PRESSURE: 166 MMHG

## 2024-07-23 VITALS — HEART RATE: 96 BPM | OXYGEN SATURATION: 99 %

## 2024-07-23 VITALS — OXYGEN SATURATION: 100 % | HEART RATE: 58 BPM

## 2024-07-23 VITALS — DIASTOLIC BLOOD PRESSURE: 81 MMHG | HEART RATE: 60 BPM | SYSTOLIC BLOOD PRESSURE: 148 MMHG | OXYGEN SATURATION: 100 %

## 2024-07-23 VITALS — OXYGEN SATURATION: 99 % | HEART RATE: 61 BPM

## 2024-07-23 VITALS — HEART RATE: 68 BPM | DIASTOLIC BLOOD PRESSURE: 75 MMHG | SYSTOLIC BLOOD PRESSURE: 130 MMHG | OXYGEN SATURATION: 100 %

## 2024-07-23 VITALS — OXYGEN SATURATION: 98 % | HEART RATE: 58 BPM

## 2024-07-23 VITALS — OXYGEN SATURATION: 98 % | HEART RATE: 64 BPM

## 2024-07-23 VITALS — HEART RATE: 57 BPM | SYSTOLIC BLOOD PRESSURE: 154 MMHG | OXYGEN SATURATION: 99 % | DIASTOLIC BLOOD PRESSURE: 78 MMHG

## 2024-07-23 VITALS — HEART RATE: 64 BPM | OXYGEN SATURATION: 99 %

## 2024-07-23 VITALS — SYSTOLIC BLOOD PRESSURE: 134 MMHG | DIASTOLIC BLOOD PRESSURE: 71 MMHG | OXYGEN SATURATION: 99 % | HEART RATE: 62 BPM

## 2024-07-23 VITALS — OXYGEN SATURATION: 100 % | SYSTOLIC BLOOD PRESSURE: 144 MMHG | DIASTOLIC BLOOD PRESSURE: 78 MMHG | HEART RATE: 70 BPM

## 2024-07-23 VITALS — HEART RATE: 70 BPM | OXYGEN SATURATION: 99 %

## 2024-07-23 VITALS — OXYGEN SATURATION: 99 % | HEART RATE: 62 BPM

## 2024-07-23 VITALS — HEART RATE: 61 BPM | OXYGEN SATURATION: 99 %

## 2024-07-23 VITALS — OXYGEN SATURATION: 100 % | HEART RATE: 63 BPM

## 2024-07-23 VITALS — OXYGEN SATURATION: 100 %

## 2024-07-23 VITALS — HEART RATE: 57 BPM | OXYGEN SATURATION: 100 %

## 2024-07-23 VITALS — OXYGEN SATURATION: 99 % | HEART RATE: 65 BPM

## 2024-09-18 ENCOUNTER — HOSPITAL ENCOUNTER (INPATIENT)
Dept: HOSPITAL 101 - ER | Age: 69
LOS: 2 days | Discharge: HOME | DRG: 175 | End: 2024-09-20
Payer: COMMERCIAL

## 2024-09-18 VITALS — OXYGEN SATURATION: 98 %

## 2024-09-18 VITALS — OXYGEN SATURATION: 98 % | HEART RATE: 73 BPM

## 2024-09-18 VITALS — HEART RATE: 73 BPM

## 2024-09-18 VITALS — OXYGEN SATURATION: 90 % | DIASTOLIC BLOOD PRESSURE: 99 MMHG | SYSTOLIC BLOOD PRESSURE: 203 MMHG

## 2024-09-18 VITALS
DIASTOLIC BLOOD PRESSURE: 99 MMHG | TEMPERATURE: 98.2 F | OXYGEN SATURATION: 93 % | SYSTOLIC BLOOD PRESSURE: 207 MMHG | HEART RATE: 80 BPM

## 2024-09-18 VITALS — SYSTOLIC BLOOD PRESSURE: 167 MMHG | OXYGEN SATURATION: 97 % | DIASTOLIC BLOOD PRESSURE: 82 MMHG

## 2024-09-18 VITALS — OXYGEN SATURATION: 100 %

## 2024-09-18 VITALS — HEART RATE: 70 BPM | OXYGEN SATURATION: 96 %

## 2024-09-18 VITALS — HEART RATE: 79 BPM | OXYGEN SATURATION: 97 %

## 2024-09-18 VITALS
OXYGEN SATURATION: 93 % | DIASTOLIC BLOOD PRESSURE: 80 MMHG | HEART RATE: 72 BPM | SYSTOLIC BLOOD PRESSURE: 173 MMHG | TEMPERATURE: 98.24 F

## 2024-09-18 VITALS
OXYGEN SATURATION: 97 % | HEART RATE: 92 BPM | SYSTOLIC BLOOD PRESSURE: 186 MMHG | TEMPERATURE: 99 F | DIASTOLIC BLOOD PRESSURE: 88 MMHG

## 2024-09-18 VITALS — OXYGEN SATURATION: 96 % | HEART RATE: 82 BPM

## 2024-09-18 VITALS — HEART RATE: 74 BPM | OXYGEN SATURATION: 95 %

## 2024-09-18 VITALS — SYSTOLIC BLOOD PRESSURE: 194 MMHG | DIASTOLIC BLOOD PRESSURE: 103 MMHG | OXYGEN SATURATION: 98 %

## 2024-09-18 VITALS
DIASTOLIC BLOOD PRESSURE: 90 MMHG | SYSTOLIC BLOOD PRESSURE: 180 MMHG | TEMPERATURE: 98.96 F | HEART RATE: 81 BPM | OXYGEN SATURATION: 100 %

## 2024-09-18 VITALS
OXYGEN SATURATION: 90 % | SYSTOLIC BLOOD PRESSURE: 159 MMHG | TEMPERATURE: 98.42 F | HEART RATE: 78 BPM | DIASTOLIC BLOOD PRESSURE: 76 MMHG

## 2024-09-18 VITALS — HEART RATE: 80 BPM | OXYGEN SATURATION: 99 %

## 2024-09-18 VITALS — HEART RATE: 74 BPM

## 2024-09-18 VITALS — DIASTOLIC BLOOD PRESSURE: 80 MMHG | SYSTOLIC BLOOD PRESSURE: 177 MMHG

## 2024-09-18 VITALS — OXYGEN SATURATION: 98 % | HEART RATE: 77 BPM

## 2024-09-18 VITALS — HEART RATE: 76 BPM | OXYGEN SATURATION: 98 %

## 2024-09-18 VITALS — OXYGEN SATURATION: 96 % | HEART RATE: 79 BPM

## 2024-09-18 VITALS — HEART RATE: 80 BPM | OXYGEN SATURATION: 97 %

## 2024-09-18 VITALS — OXYGEN SATURATION: 97 % | HEART RATE: 76 BPM

## 2024-09-18 VITALS — OXYGEN SATURATION: 100 % | HEART RATE: 80 BPM

## 2024-09-18 VITALS — HEART RATE: 78 BPM

## 2024-09-18 VITALS — OXYGEN SATURATION: 96 % | HEART RATE: 77 BPM

## 2024-09-18 VITALS — HEART RATE: 83 BPM | OXYGEN SATURATION: 97 %

## 2024-09-18 VITALS — HEART RATE: 88 BPM | OXYGEN SATURATION: 98 %

## 2024-09-18 VITALS — HEART RATE: 76 BPM | OXYGEN SATURATION: 97 %

## 2024-09-18 VITALS — HEART RATE: 67 BPM

## 2024-09-18 VITALS — DIASTOLIC BLOOD PRESSURE: 85 MMHG | HEART RATE: 83 BPM | OXYGEN SATURATION: 97 % | SYSTOLIC BLOOD PRESSURE: 173 MMHG

## 2024-09-18 VITALS — BODY MASS INDEX: 33.1 KG/M2

## 2024-09-18 VITALS — HEART RATE: 71 BPM

## 2024-09-18 VITALS — HEART RATE: 72 BPM | OXYGEN SATURATION: 98 %

## 2024-09-18 VITALS — HEART RATE: 81 BPM

## 2024-09-18 VITALS — OXYGEN SATURATION: 97 % | HEART RATE: 85 BPM

## 2024-09-18 VITALS — OXYGEN SATURATION: 100 % | HEART RATE: 81 BPM

## 2024-09-18 VITALS — HEART RATE: 86 BPM

## 2024-09-18 DIAGNOSIS — Z98.890: ICD-10-CM

## 2024-09-18 DIAGNOSIS — I21.A1: ICD-10-CM

## 2024-09-18 DIAGNOSIS — J18.9: ICD-10-CM

## 2024-09-18 DIAGNOSIS — I25.10: ICD-10-CM

## 2024-09-18 DIAGNOSIS — Z85.46: ICD-10-CM

## 2024-09-18 DIAGNOSIS — Z79.4: ICD-10-CM

## 2024-09-18 DIAGNOSIS — E11.65: ICD-10-CM

## 2024-09-18 DIAGNOSIS — I50.22: ICD-10-CM

## 2024-09-18 DIAGNOSIS — J45.909: ICD-10-CM

## 2024-09-18 DIAGNOSIS — Z86.73: ICD-10-CM

## 2024-09-18 DIAGNOSIS — E78.00: ICD-10-CM

## 2024-09-18 DIAGNOSIS — Z87.891: ICD-10-CM

## 2024-09-18 DIAGNOSIS — Z79.82: ICD-10-CM

## 2024-09-18 DIAGNOSIS — Z79.899: ICD-10-CM

## 2024-09-18 DIAGNOSIS — I15.2: ICD-10-CM

## 2024-09-18 DIAGNOSIS — I25.2: ICD-10-CM

## 2024-09-18 DIAGNOSIS — I26.99: Primary | ICD-10-CM

## 2024-09-18 DIAGNOSIS — I67.9: ICD-10-CM

## 2024-09-18 DIAGNOSIS — F32.A: ICD-10-CM

## 2024-09-18 DIAGNOSIS — Z79.84: ICD-10-CM

## 2024-09-18 DIAGNOSIS — G47.30: ICD-10-CM

## 2024-09-18 DIAGNOSIS — F01.50: ICD-10-CM

## 2024-09-18 LAB
ADD MANUAL DIFF: NO
ALANINE AMINOTRANSFERASE: 53 U/L (ref 16–63)
ALBUMIN GLOBULIN RATIO: 0.9
ALBUMIN LEVEL: 3.6 G/DL (ref 3.4–5)
ALKALINE PHOSPHATASE: 118 U/L (ref 46–116)
ANION GAP: 13.8
ASPARTATE AMINO TRANSFERASE: 32 U/L (ref 15–37)
BLOOD UREA NITROGEN: 8 MG/DL (ref 7–18)
CALCIUM: 9.3 MG/DL (ref 8.5–10.1)
CARBON DIOXIDE: 26.8 MMOL/L (ref 21–32)
CAST SEEN?: (no result) #/LPF
CHLORIDE: 103 MMOL/L (ref 98–107)
CO2 BLD-SCNC: 26.8 MMOL/L (ref 21–32)
ESTIMATED GFR (AFRICAN AMERICA: >60 (ref 60–?)
ESTIMATED GFR (NON-AFRICAN AME: >60 (ref 60–?)
GLOBULIN: 4 G/DL
GLUCOSE BLD-MCNC: 223 MG/DL (ref 74–106)
GLUCOSE URINE UA: 100 MG/DL
HCT VFR BLD CALC: 39.9 % (ref 42–54)
HEMATOCRIT: 39.9 % (ref 42–54)
HEMOGLOBIN: 13.2 G/DL (ref 14–18)
IMMATURE GRANULOCYTES ABS AUTO: 0.02 10^3/UL (ref 0–0.03)
IMMATURE GRANULOCYTES PCT AUTO: 0.2 % (ref 0–0.5)
LACTATE/LACTIC ACID: 1.2 MMOL/L (ref 0.4–2)
LACTATE/LACTIC ACID: 1.5 MMOL/L (ref 0.4–2)
LYMPHOCYTES  ABSOLUTE AUTO: 1.2 10^3/UL (ref 1.2–3.8)
MCV RBC: 86.2 FL (ref 80–94)
MEAN CORPUSCULAR HEMOGLOBIN: 28.5 PG (ref 25.9–34)
MEAN CORPUSCULAR HGB CONC: 33.1 G/DL (ref 29.9–35.2)
MEAN CORPUSCULAR VOLUME: 86.2 FL (ref 80–94)
NT PRO B TYPE NATRIURETIC PEPT: 520 PG/ML (ref ?–900)
PLATELET # BLD: 179 10^3/UL (ref 150–450)
PLATELET COUNT: 179 10^3/UL (ref 150–450)
POTASSIUM SERPLBLD-SCNC: 3.6 MMOL/L (ref 3.5–5.1)
POTASSIUM: 3.6 MMOL/L (ref 3.5–5.1)
RED BLOOD COUNT: 4.63 10^6/UL (ref 4.7–6.1)
SARS-COV-2 AG: NEGATIVE
SODIUM BLD-SCNC: 140 MMOL/L (ref 136–145)
SODIUM: 140 MMOL/L (ref 136–145)
TOTAL PROTEIN: 7.6 G/DL (ref 6.4–8.2)
URINE CULTURE INDICATED: NO
WBC # BLD: 8.3 10^3/UL (ref 4–11)
WHITE BLOOD COUNT: 8.3 10^3/UL (ref 4–11)

## 2024-09-18 PROCEDURE — 96375 TX/PRO/DX INJ NEW DRUG ADDON: CPT

## 2024-09-18 PROCEDURE — 80053 COMPREHEN METABOLIC PANEL: CPT

## 2024-09-18 PROCEDURE — 83880 ASSAY OF NATRIURETIC PEPTIDE: CPT

## 2024-09-18 PROCEDURE — 96372 THER/PROPH/DIAG INJ SC/IM: CPT

## 2024-09-18 PROCEDURE — 82948 REAGENT STRIP/BLOOD GLUCOSE: CPT

## 2024-09-18 PROCEDURE — 96361 HYDRATE IV INFUSION ADD-ON: CPT

## 2024-09-18 PROCEDURE — 84484 ASSAY OF TROPONIN QUANT: CPT

## 2024-09-18 PROCEDURE — 81001 URINALYSIS AUTO W/SCOPE: CPT

## 2024-09-18 PROCEDURE — 94640 AIRWAY INHALATION TREATMENT: CPT

## 2024-09-18 PROCEDURE — 96366 THER/PROPH/DIAG IV INF ADDON: CPT

## 2024-09-18 PROCEDURE — 71275 CT ANGIOGRAPHY CHEST: CPT

## 2024-09-18 PROCEDURE — 87811 SARS-COV-2 COVID19 W/OPTIC: CPT

## 2024-09-18 PROCEDURE — 94761 N-INVAS EAR/PLS OXIMETRY MLT: CPT

## 2024-09-18 PROCEDURE — 93005 ELECTROCARDIOGRAM TRACING: CPT

## 2024-09-18 PROCEDURE — 84443 ASSAY THYROID STIM HORMONE: CPT

## 2024-09-18 PROCEDURE — 87040 BLOOD CULTURE FOR BACTERIA: CPT

## 2024-09-18 PROCEDURE — 36415 COLL VENOUS BLD VENIPUNCTURE: CPT

## 2024-09-18 PROCEDURE — 94667 MNPJ CHEST WALL 1ST: CPT

## 2024-09-18 PROCEDURE — 71045 X-RAY EXAM CHEST 1 VIEW: CPT

## 2024-09-18 PROCEDURE — 99285 EMERGENCY DEPT VISIT HI MDM: CPT

## 2024-09-18 PROCEDURE — 96365 THER/PROPH/DIAG IV INF INIT: CPT

## 2024-09-18 PROCEDURE — 93970 EXTREMITY STUDY: CPT

## 2024-09-18 PROCEDURE — 94668 MNPJ CHEST WALL SBSQ: CPT

## 2024-09-18 PROCEDURE — 87804 INFLUENZA ASSAY W/OPTIC: CPT

## 2024-09-18 PROCEDURE — 85025 COMPLETE CBC W/AUTO DIFF WBC: CPT

## 2024-09-18 PROCEDURE — 97161 PT EVAL LOW COMPLEX 20 MIN: CPT

## 2024-09-18 PROCEDURE — 93306 TTE W/DOPPLER COMPLETE: CPT

## 2024-09-18 PROCEDURE — 83605 ASSAY OF LACTIC ACID: CPT

## 2024-09-18 PROCEDURE — 80061 LIPID PANEL: CPT

## 2024-09-18 RX ADMIN — MONTELUKAST 10 MG: 10 TABLET, FILM COATED ORAL at 21:33

## 2024-09-18 RX ADMIN — METOPROLOL SUCCINATE 50 MG: 50 TABLET, EXTENDED RELEASE ORAL at 15:32

## 2024-09-18 RX ADMIN — INSULIN ASPART 0 UNIT: 100 INJECTION, SOLUTION INTRAVENOUS; SUBCUTANEOUS at 21:33

## 2024-09-18 RX ADMIN — IPRATROPIUM BROMIDE AND ALBUTEROL SULFATE 3 ML: .5; 2.5 SOLUTION RESPIRATORY (INHALATION) at 17:47

## 2024-09-18 RX ADMIN — ENOXAPARIN SODIUM 100 MG: 100 INJECTION SUBCUTANEOUS at 13:37

## 2024-09-18 RX ADMIN — LEVOFLOXACIN 100 MG: 750 INJECTION, SOLUTION INTRAVENOUS at 14:21

## 2024-09-18 RX ADMIN — IPRATROPIUM BROMIDE AND ALBUTEROL SULFATE 3 ML: .5; 2.5 SOLUTION RESPIRATORY (INHALATION) at 12:01

## 2024-09-18 RX ADMIN — LISINOPRIL AND HYDROCHLOROTHIAZIDE 1 TAB: 12.5; 2 TABLET ORAL at 21:33

## 2024-09-18 RX ADMIN — IPRATROPIUM BROMIDE AND ALBUTEROL SULFATE 3 ML: .5; 2.5 SOLUTION RESPIRATORY (INHALATION) at 22:43

## 2024-09-18 RX ADMIN — SODIUM CHLORIDE 500 ML: 9 INJECTION, SOLUTION INTRAVENOUS at 10:58

## 2024-09-18 RX ADMIN — ATORVASTATIN CALCIUM 40 MG: 40 TABLET, FILM COATED ORAL at 21:33

## 2024-09-18 RX ADMIN — INSULIN ASPART 0 UNIT: 100 INJECTION, SOLUTION INTRAVENOUS; SUBCUTANEOUS at 16:10

## 2024-09-18 RX ADMIN — KETOROLAC TROMETHAMINE 15 MG: 30 INJECTION, SOLUTION INTRAMUSCULAR; INTRAVENOUS at 10:58

## 2024-09-18 RX ADMIN — AMLODIPINE BESYLATE 10 MG: 5 TABLET ORAL at 15:32

## 2024-09-19 VITALS — HEART RATE: 76 BPM

## 2024-09-19 VITALS — OXYGEN SATURATION: 97 % | HEART RATE: 83 BPM

## 2024-09-19 VITALS
DIASTOLIC BLOOD PRESSURE: 78 MMHG | SYSTOLIC BLOOD PRESSURE: 137 MMHG | TEMPERATURE: 98.24 F | HEART RATE: 70 BPM | OXYGEN SATURATION: 96 %

## 2024-09-19 VITALS
HEART RATE: 68 BPM | OXYGEN SATURATION: 97 % | DIASTOLIC BLOOD PRESSURE: 98 MMHG | TEMPERATURE: 98.42 F | SYSTOLIC BLOOD PRESSURE: 176 MMHG

## 2024-09-19 VITALS
DIASTOLIC BLOOD PRESSURE: 80 MMHG | OXYGEN SATURATION: 96 % | HEART RATE: 63 BPM | SYSTOLIC BLOOD PRESSURE: 170 MMHG | TEMPERATURE: 98.4 F

## 2024-09-19 VITALS
TEMPERATURE: 98.24 F | OXYGEN SATURATION: 92 % | SYSTOLIC BLOOD PRESSURE: 170 MMHG | DIASTOLIC BLOOD PRESSURE: 80 MMHG | HEART RATE: 70 BPM

## 2024-09-19 VITALS — OXYGEN SATURATION: 95 % | HEART RATE: 83 BPM

## 2024-09-19 VITALS
OXYGEN SATURATION: 92 % | DIASTOLIC BLOOD PRESSURE: 82 MMHG | HEART RATE: 78 BPM | TEMPERATURE: 97.52 F | SYSTOLIC BLOOD PRESSURE: 171 MMHG

## 2024-09-19 VITALS — HEART RATE: 77 BPM

## 2024-09-19 VITALS — OXYGEN SATURATION: 96 % | HEART RATE: 71 BPM

## 2024-09-19 VITALS — HEART RATE: 82 BPM

## 2024-09-19 VITALS — HEART RATE: 70 BPM

## 2024-09-19 VITALS
SYSTOLIC BLOOD PRESSURE: 140 MMHG | OXYGEN SATURATION: 92 % | TEMPERATURE: 98.5 F | HEART RATE: 74 BPM | DIASTOLIC BLOOD PRESSURE: 70 MMHG

## 2024-09-19 VITALS — HEART RATE: 69 BPM

## 2024-09-19 VITALS — HEART RATE: 65 BPM

## 2024-09-19 VITALS — HEART RATE: 67 BPM

## 2024-09-19 VITALS — HEART RATE: 75 BPM

## 2024-09-19 VITALS — OXYGEN SATURATION: 93 %

## 2024-09-19 VITALS — HEART RATE: 79 BPM

## 2024-09-19 VITALS — HEART RATE: 86 BPM | OXYGEN SATURATION: 98 %

## 2024-09-19 LAB
ADD MANUAL DIFF: NO
ALANINE AMINOTRANSFERASE: 48 U/L (ref 16–63)
ALBUMIN GLOBULIN RATIO: 0.9
ALBUMIN LEVEL: 3.1 G/DL (ref 3.4–5)
ALKALINE PHOSPHATASE: 98 U/L (ref 46–116)
ANION GAP: 10.4
ASPARTATE AMINO TRANSFERASE: 37 U/L (ref 15–37)
BLOOD UREA NITROGEN: 7 MG/DL (ref 7–18)
CALCIUM: 9 MG/DL (ref 8.5–10.1)
CARBON DIOXIDE: 30 MMOL/L (ref 21–32)
CHLORIDE: 102 MMOL/L (ref 98–107)
CHOLESTEROL: 134 MG/DL (ref ?–200)
CO2 BLD-SCNC: 30 MMOL/L (ref 21–32)
ESTIMATED GFR (AFRICAN AMERICA: >60 (ref 60–?)
ESTIMATED GFR (NON-AFRICAN AME: >60 (ref 60–?)
GLOBULIN: 3.5 G/DL
GLUCOSE BLD-MCNC: 139 MG/DL (ref 74–106)
HCT VFR BLD CALC: 35.5 % (ref 42–54)
HDL CHOLESTEROL: 49 MG/DL (ref 40–60)
HEMATOCRIT: 35.5 % (ref 42–54)
HEMOGLOBIN: 11.9 G/DL (ref 14–18)
IMMATURE GRANULOCYTES ABS AUTO: 0.02 10^3/UL (ref 0–0.03)
IMMATURE GRANULOCYTES PCT AUTO: 0.3 % (ref 0–0.5)
LYMPHOCYTES  ABSOLUTE AUTO: 1.7 10^3/UL (ref 1.2–3.8)
MCV RBC: 86.4 FL (ref 80–94)
MEAN CORPUSCULAR HEMOGLOBIN: 29 PG (ref 25.9–34)
MEAN CORPUSCULAR HGB CONC: 33.5 G/DL (ref 29.9–35.2)
MEAN CORPUSCULAR VOLUME: 86.4 FL (ref 80–94)
PLATELET # BLD: 146 10^3/UL (ref 150–450)
PLATELET COUNT: 146 10^3/UL (ref 150–450)
POTASSIUM SERPLBLD-SCNC: 3.4 MMOL/L (ref 3.5–5.1)
POTASSIUM: 3.4 MMOL/L (ref 3.5–5.1)
RED BLOOD COUNT: 4.11 10^6/UL (ref 4.7–6.1)
SODIUM BLD-SCNC: 139 MMOL/L (ref 136–145)
SODIUM: 139 MMOL/L (ref 136–145)
THYROID STIMULATING HORMONE: 1.56 UIU/ML (ref 0.36–3.74)
TOTAL PROTEIN: 6.6 G/DL (ref 6.4–8.2)
TRIGLYCERIDES: 83 MG/DL (ref ?–150)
VLDL CHOLESTEROL: 16.6 MG/DL
WBC # BLD: 6.3 10^3/UL (ref 4–11)
WHITE BLOOD COUNT: 6.3 10^3/UL (ref 4–11)

## 2024-09-19 RX ADMIN — IPRATROPIUM BROMIDE AND ALBUTEROL SULFATE 3 ML: .5; 2.5 SOLUTION RESPIRATORY (INHALATION) at 17:12

## 2024-09-19 RX ADMIN — MONTELUKAST 10 MG: 10 TABLET, FILM COATED ORAL at 21:07

## 2024-09-19 RX ADMIN — ATORVASTATIN CALCIUM 40 MG: 40 TABLET, FILM COATED ORAL at 21:07

## 2024-09-19 RX ADMIN — ASPIRIN 81 MG 81 MG: 81 TABLET ORAL at 09:31

## 2024-09-19 RX ADMIN — AMLODIPINE BESYLATE 10 MG: 5 TABLET ORAL at 09:31

## 2024-09-19 RX ADMIN — LEVOFLOXACIN 100 MG: 750 INJECTION, SOLUTION INTRAVENOUS at 13:54

## 2024-09-19 RX ADMIN — TAMSULOSIN HYDROCHLORIDE 0.4 MG: 0.4 CAPSULE ORAL at 09:31

## 2024-09-19 RX ADMIN — EZETIMIBE 10 MG: 10 TABLET ORAL at 09:31

## 2024-09-19 RX ADMIN — CARVEDILOL 6.25 MG: 6.25 TABLET, FILM COATED ORAL at 09:31

## 2024-09-19 RX ADMIN — ENOXAPARIN SODIUM 100 MG: 100 INJECTION SUBCUTANEOUS at 01:09

## 2024-09-19 RX ADMIN — IPRATROPIUM BROMIDE AND ALBUTEROL SULFATE 3 ML: .5; 2.5 SOLUTION RESPIRATORY (INHALATION) at 05:15

## 2024-09-19 RX ADMIN — LISINOPRIL AND HYDROCHLOROTHIAZIDE 1 TAB: 12.5; 2 TABLET ORAL at 09:32

## 2024-09-19 RX ADMIN — INSULIN ASPART 0 UNIT: 100 INJECTION, SOLUTION INTRAVENOUS; SUBCUTANEOUS at 13:54

## 2024-09-19 RX ADMIN — CARVEDILOL 6.25 MG: 6.25 TABLET, FILM COATED ORAL at 21:07

## 2024-09-19 RX ADMIN — APIXABAN 10 MG: 5 TABLET, FILM COATED ORAL at 09:31

## 2024-09-19 RX ADMIN — INSULIN ASPART 0 UNIT: 100 INJECTION, SOLUTION INTRAVENOUS; SUBCUTANEOUS at 18:12

## 2024-09-19 RX ADMIN — LISINOPRIL AND HYDROCHLOROTHIAZIDE 1 TAB: 12.5; 2 TABLET ORAL at 21:07

## 2024-09-19 RX ADMIN — INSULIN ASPART 0 UNIT: 100 INJECTION, SOLUTION INTRAVENOUS; SUBCUTANEOUS at 21:08

## 2024-09-19 RX ADMIN — APIXABAN 10 MG: 5 TABLET, FILM COATED ORAL at 21:07

## 2024-09-19 RX ADMIN — IPRATROPIUM BROMIDE AND ALBUTEROL SULFATE 3 ML: .5; 2.5 SOLUTION RESPIRATORY (INHALATION) at 23:12

## 2024-09-19 RX ADMIN — IPRATROPIUM BROMIDE AND ALBUTEROL SULFATE 3 ML: .5; 2.5 SOLUTION RESPIRATORY (INHALATION) at 11:42

## 2024-09-19 RX ADMIN — INSULIN ASPART 0 UNIT: 100 INJECTION, SOLUTION INTRAVENOUS; SUBCUTANEOUS at 09:29

## 2024-09-20 VITALS — OXYGEN SATURATION: 97 % | HEART RATE: 75 BPM

## 2024-09-20 VITALS — HEART RATE: 95 BPM

## 2024-09-20 VITALS
DIASTOLIC BLOOD PRESSURE: 80 MMHG | TEMPERATURE: 98.2 F | SYSTOLIC BLOOD PRESSURE: 170 MMHG | OXYGEN SATURATION: 97 % | HEART RATE: 69 BPM

## 2024-09-20 VITALS
HEART RATE: 101 BPM | SYSTOLIC BLOOD PRESSURE: 176 MMHG | DIASTOLIC BLOOD PRESSURE: 98 MMHG | TEMPERATURE: 98.24 F | OXYGEN SATURATION: 96 %

## 2024-09-20 VITALS — HEART RATE: 75 BPM

## 2024-09-20 VITALS — HEART RATE: 72 BPM

## 2024-09-20 VITALS — OXYGEN SATURATION: 93 %

## 2024-09-20 VITALS — HEART RATE: 78 BPM

## 2024-09-20 LAB
ADD MANUAL DIFF: NO
ALANINE AMINOTRANSFERASE: 52 U/L (ref 16–63)
ALBUMIN GLOBULIN RATIO: 0.9
ALBUMIN LEVEL: 3.2 G/DL (ref 3.4–5)
ALKALINE PHOSPHATASE: 96 U/L (ref 46–116)
ANION GAP: 8.5
ASPARTATE AMINO TRANSFERASE: 36 U/L (ref 15–37)
BLOOD UREA NITROGEN: 8 MG/DL (ref 7–18)
CALCIUM: 9 MG/DL (ref 8.5–10.1)
CARBON DIOXIDE: 33 MMOL/L (ref 21–32)
CHLORIDE: 97 MMOL/L (ref 98–107)
CO2 BLD-SCNC: 33 MMOL/L (ref 21–32)
ESTIMATED GFR (AFRICAN AMERICA: >60 (ref 60–?)
ESTIMATED GFR (NON-AFRICAN AME: >60 (ref 60–?)
GLOBULIN: 3.7 G/DL
GLUCOSE BLD-MCNC: 172 MG/DL (ref 74–106)
HCT VFR BLD CALC: 36.6 % (ref 42–54)
HEMATOCRIT: 36.6 % (ref 42–54)
HEMOGLOBIN: 12 G/DL (ref 14–18)
IMMATURE GRANULOCYTES ABS AUTO: 0.01 10^3/UL (ref 0–0.03)
IMMATURE GRANULOCYTES PCT AUTO: 0.2 % (ref 0–0.5)
LYMPHOCYTES  ABSOLUTE AUTO: 1.5 10^3/UL (ref 1.2–3.8)
MCV RBC: 85.1 FL (ref 80–94)
MEAN CORPUSCULAR HEMOGLOBIN: 27.9 PG (ref 25.9–34)
MEAN CORPUSCULAR HGB CONC: 32.8 G/DL (ref 29.9–35.2)
MEAN CORPUSCULAR VOLUME: 85.1 FL (ref 80–94)
PLATELET # BLD: 164 10^3/UL (ref 150–450)
PLATELET COUNT: 164 10^3/UL (ref 150–450)
POTASSIUM SERPLBLD-SCNC: 3.5 MMOL/L (ref 3.5–5.1)
POTASSIUM: 3.5 MMOL/L (ref 3.5–5.1)
RED BLOOD COUNT: 4.3 10^6/UL (ref 4.7–6.1)
SODIUM BLD-SCNC: 135 MMOL/L (ref 136–145)
SODIUM: 135 MMOL/L (ref 136–145)
TOTAL PROTEIN: 6.9 G/DL (ref 6.4–8.2)
WBC # BLD: 5.8 10^3/UL (ref 4–11)
WHITE BLOOD COUNT: 5.8 10^3/UL (ref 4–11)

## 2024-09-20 RX ADMIN — EZETIMIBE 10 MG: 10 TABLET ORAL at 08:25

## 2024-09-20 RX ADMIN — AMLODIPINE BESYLATE 10 MG: 5 TABLET ORAL at 08:25

## 2024-09-20 RX ADMIN — APIXABAN 10 MG: 5 TABLET, FILM COATED ORAL at 08:25

## 2024-09-20 RX ADMIN — LISINOPRIL AND HYDROCHLOROTHIAZIDE 1 TAB: 12.5; 2 TABLET ORAL at 08:25

## 2024-09-20 RX ADMIN — ASPIRIN 81 MG 81 MG: 81 TABLET ORAL at 08:25

## 2024-09-20 RX ADMIN — CARVEDILOL 6.25 MG: 6.25 TABLET, FILM COATED ORAL at 08:25

## 2024-09-20 RX ADMIN — IPRATROPIUM BROMIDE AND ALBUTEROL SULFATE 3 ML: .5; 2.5 SOLUTION RESPIRATORY (INHALATION) at 05:19

## 2024-09-20 RX ADMIN — TAMSULOSIN HYDROCHLORIDE 0.4 MG: 0.4 CAPSULE ORAL at 08:25

## 2024-09-20 RX ADMIN — GUAIFENESIN AND DEXTROMETHORPHAN 10 ML: 100; 10 SYRUP ORAL at 08:25

## 2025-02-09 ENCOUNTER — HOSPITAL ENCOUNTER (INPATIENT)
Dept: HOSPITAL 101 - ER | Age: 70
LOS: 3 days | Discharge: HOME | DRG: 871 | End: 2025-02-12
Payer: COMMERCIAL

## 2025-02-09 VITALS — BODY MASS INDEX: 31.4 KG/M2 | BODY MASS INDEX: 26.2 KG/M2 | BODY MASS INDEX: 7 KG/M2

## 2025-02-09 VITALS
HEART RATE: 107 BPM | SYSTOLIC BLOOD PRESSURE: 184 MMHG | TEMPERATURE: 102.9 F | OXYGEN SATURATION: 93 % | DIASTOLIC BLOOD PRESSURE: 84 MMHG

## 2025-02-09 VITALS — OXYGEN SATURATION: 96 %

## 2025-02-09 VITALS — OXYGEN SATURATION: 93 % | DIASTOLIC BLOOD PRESSURE: 84 MMHG | SYSTOLIC BLOOD PRESSURE: 184 MMHG

## 2025-02-09 VITALS — DIASTOLIC BLOOD PRESSURE: 90 MMHG | HEART RATE: 102 BPM | SYSTOLIC BLOOD PRESSURE: 184 MMHG

## 2025-02-09 VITALS — DIASTOLIC BLOOD PRESSURE: 84 MMHG | SYSTOLIC BLOOD PRESSURE: 188 MMHG | OXYGEN SATURATION: 95 % | HEART RATE: 104 BPM

## 2025-02-09 VITALS — OXYGEN SATURATION: 95 %

## 2025-02-09 VITALS — OXYGEN SATURATION: 97 %

## 2025-02-09 VITALS — HEART RATE: 108 BPM | OXYGEN SATURATION: 96 %

## 2025-02-09 VITALS — HEART RATE: 103 BPM | OXYGEN SATURATION: 96 %

## 2025-02-09 VITALS — TEMPERATURE: 102.92 F | OXYGEN SATURATION: 93 %

## 2025-02-09 VITALS — OXYGEN SATURATION: 94 % | HEART RATE: 102 BPM

## 2025-02-09 VITALS — HEART RATE: 105 BPM

## 2025-02-09 VITALS — HEART RATE: 104 BPM

## 2025-02-09 DIAGNOSIS — A41.9: Primary | ICD-10-CM

## 2025-02-09 DIAGNOSIS — Z79.84: ICD-10-CM

## 2025-02-09 DIAGNOSIS — I67.9: ICD-10-CM

## 2025-02-09 DIAGNOSIS — E11.65: ICD-10-CM

## 2025-02-09 DIAGNOSIS — R06.03: ICD-10-CM

## 2025-02-09 DIAGNOSIS — Z87.891: ICD-10-CM

## 2025-02-09 DIAGNOSIS — I25.10: ICD-10-CM

## 2025-02-09 DIAGNOSIS — Z86.711: ICD-10-CM

## 2025-02-09 DIAGNOSIS — I25.2: ICD-10-CM

## 2025-02-09 DIAGNOSIS — E78.00: ICD-10-CM

## 2025-02-09 DIAGNOSIS — Z95.818: ICD-10-CM

## 2025-02-09 DIAGNOSIS — C61: ICD-10-CM

## 2025-02-09 DIAGNOSIS — I10: ICD-10-CM

## 2025-02-09 DIAGNOSIS — G47.30: ICD-10-CM

## 2025-02-09 DIAGNOSIS — I21.A1: ICD-10-CM

## 2025-02-09 DIAGNOSIS — Z79.01: ICD-10-CM

## 2025-02-09 DIAGNOSIS — R50.9: ICD-10-CM

## 2025-02-09 DIAGNOSIS — Z86.73: ICD-10-CM

## 2025-02-09 DIAGNOSIS — Z79.82: ICD-10-CM

## 2025-02-09 DIAGNOSIS — R10.11: ICD-10-CM

## 2025-02-09 DIAGNOSIS — R10.9: ICD-10-CM

## 2025-02-09 DIAGNOSIS — J45.909: ICD-10-CM

## 2025-02-09 DIAGNOSIS — Z79.4: ICD-10-CM

## 2025-02-09 DIAGNOSIS — D84.821: ICD-10-CM

## 2025-02-09 DIAGNOSIS — F01.50: ICD-10-CM

## 2025-02-09 DIAGNOSIS — E83.42: ICD-10-CM

## 2025-02-09 DIAGNOSIS — K57.92: ICD-10-CM

## 2025-02-09 LAB
ADD MANUAL DIFF: YES
ANION GAP: 15.5
ATYPICAL LYMPHOCYTES % MANUAL: 2 %
ATYPICAL LYMPHOCYTES ABS MAN: 0.17
BASOPHILS ABS MANUAL: 0.08 10^3/UL (ref 0–0.1)
BASOPHILS NFR SPEC MANUAL: 1 % (ref 0.2–2)
BLOOD UREA NITROGEN: 9 MG/DL (ref 7–18)
CALCIUM: 9 MG/DL (ref 8.5–10.1)
CARBON DIOXIDE: 25.4 MMOL/L (ref 21–32)
CAST SEEN?: (no result) #/LPF
CHLORIDE: 101 MMOL/L (ref 98–107)
CO2 BLD-SCNC: 25.4 MMOL/L (ref 21–32)
DACRYOCYTES BLD QL SMEAR: (no result)
EOSINOPHILS ABSOLUTE MANUAL: 0.25 10^3/UL (ref 0–0.7)
EOSINOPHILS PERCENT MANUAL: 3 % (ref 0.9–7)
ESTIMATED GFR (AFRICAN AMERICA: >60
ESTIMATED GFR (NON-AFRICAN AME: >60
GLUCOSE BLD-MCNC: 263 MG/DL (ref 74–106)
GLUCOSE URINE UA: 500 MG/DL
HCT VFR BLD CALC: 43.8 % (ref 42–54)
HEMATOCRIT: 43.8 % (ref 42–54)
HEMOGLOBIN: 14.4 G/DL (ref 14–18)
LACTATE/LACTIC ACID: 1.5 MMOL/L (ref 0.4–2)
LYMPHOCYTES ABSOLUTE MANUAL: 0.34 10^3/UL (ref 1.2–3.8)
LYMPHOCYTES PERCENT MANUAL: 4 % (ref 20.5–60)
MCV RBC: 84.2 FL (ref 80–94)
MEAN CORPUSCULAR HEMOGLOBIN: 27.7 PG (ref 25.9–34)
MEAN CORPUSCULAR HGB CONC: 32.9 G/DL (ref 29.9–35.2)
MEAN CORPUSCULAR VOLUME: 84.2 FL (ref 80–94)
MONOCYTES ABSOLUTE MANUAL: 0.08 10^3/UL (ref 0.3–0.8)
MONOCYTES PERCENT MANUAL: 1 % (ref 1.7–12)
PLATELET # BLD: 154 10^3/UL (ref 150–450)
PLATELET COUNT: 154 10^3/UL (ref 150–450)
POTASSIUM SERPLBLD-SCNC: 3.9 MMOL/L (ref 3.5–5.1)
POTASSIUM: 3.9 MMOL/L (ref 3.5–5.1)
RED BLOOD COUNT: 5.2 10^6/UL (ref 4.7–6.1)
SARS-COV-2 AG: NEGATIVE
SEGMENTED NEUT ABSOLUTE MANUAL: 7.74 10^3/UL (ref 1.4–6.5)
SEGMENTED NEUTROPHILS % MANUAL: 90 (ref 43–75)
SODIUM BLD-SCNC: 138 MMOL/L (ref 136–145)
SODIUM: 138 MMOL/L (ref 136–145)
TEAR DROP CELLS: (no result)
URINE CULTURE INDICATED: NO
WBC # BLD: 8.6 10^3/UL (ref 4–11)
WHITE BLOOD COUNT: 8.6 10^3/UL (ref 4–11)

## 2025-02-09 PROCEDURE — 97165 OT EVAL LOW COMPLEX 30 MIN: CPT

## 2025-02-09 PROCEDURE — 81001 URINALYSIS AUTO W/SCOPE: CPT

## 2025-02-09 PROCEDURE — 82140 ASSAY OF AMMONIA: CPT

## 2025-02-09 PROCEDURE — 87804 INFLUENZA ASSAY W/OPTIC: CPT

## 2025-02-09 PROCEDURE — 71045 X-RAY EXAM CHEST 1 VIEW: CPT

## 2025-02-09 PROCEDURE — 87040 BLOOD CULTURE FOR BACTERIA: CPT

## 2025-02-09 PROCEDURE — 70470 CT HEAD/BRAIN W/O & W/DYE: CPT

## 2025-02-09 PROCEDURE — 80074 ACUTE HEPATITIS PANEL: CPT

## 2025-02-09 PROCEDURE — 84484 ASSAY OF TROPONIN QUANT: CPT

## 2025-02-09 PROCEDURE — 93308 TTE F-UP OR LMTD: CPT

## 2025-02-09 PROCEDURE — 93005 ELECTROCARDIOGRAM TRACING: CPT

## 2025-02-09 PROCEDURE — 85610 PROTHROMBIN TIME: CPT

## 2025-02-09 PROCEDURE — 99285 EMERGENCY DEPT VISIT HI MDM: CPT

## 2025-02-09 PROCEDURE — 80048 BASIC METABOLIC PNL TOTAL CA: CPT

## 2025-02-09 PROCEDURE — 83605 ASSAY OF LACTIC ACID: CPT

## 2025-02-09 PROCEDURE — 85652 RBC SED RATE AUTOMATED: CPT

## 2025-02-09 PROCEDURE — 36415 COLL VENOUS BLD VENIPUNCTURE: CPT

## 2025-02-09 PROCEDURE — 97535 SELF CARE MNGMENT TRAINING: CPT

## 2025-02-09 PROCEDURE — 85027 COMPLETE CBC AUTOMATED: CPT

## 2025-02-09 PROCEDURE — 87811 SARS-COV-2 COVID19 W/OPTIC: CPT

## 2025-02-09 PROCEDURE — 97530 THERAPEUTIC ACTIVITIES: CPT

## 2025-02-09 PROCEDURE — 76705 ECHO EXAM OF ABDOMEN: CPT

## 2025-02-09 PROCEDURE — 83690 ASSAY OF LIPASE: CPT

## 2025-02-09 PROCEDURE — 85025 COMPLETE CBC W/AUTO DIFF WBC: CPT

## 2025-02-09 PROCEDURE — 97161 PT EVAL LOW COMPLEX 20 MIN: CPT

## 2025-02-09 PROCEDURE — 94761 N-INVAS EAR/PLS OXIMETRY MLT: CPT

## 2025-02-09 PROCEDURE — 80076 HEPATIC FUNCTION PANEL: CPT

## 2025-02-09 PROCEDURE — 94667 MNPJ CHEST WALL 1ST: CPT

## 2025-02-09 PROCEDURE — 85730 THROMBOPLASTIN TIME PARTIAL: CPT

## 2025-02-09 PROCEDURE — 83880 ASSAY OF NATRIURETIC PEPTIDE: CPT

## 2025-02-09 PROCEDURE — 83735 ASSAY OF MAGNESIUM: CPT

## 2025-02-09 PROCEDURE — 94668 MNPJ CHEST WALL SBSQ: CPT

## 2025-02-09 PROCEDURE — 80053 COMPREHEN METABOLIC PANEL: CPT

## 2025-02-09 PROCEDURE — 82948 REAGENT STRIP/BLOOD GLUCOSE: CPT

## 2025-02-09 PROCEDURE — 74177 CT ABD & PELVIS W/CONTRAST: CPT

## 2025-02-09 PROCEDURE — 86140 C-REACTIVE PROTEIN: CPT

## 2025-02-09 PROCEDURE — 85007 BL SMEAR W/DIFF WBC COUNT: CPT

## 2025-02-09 PROCEDURE — 82150 ASSAY OF AMYLASE: CPT

## 2025-02-09 RX ADMIN — LIDOCAINE HYDROCHLORIDE 20 ML: 20 JELLY TOPICAL at 22:45

## 2025-02-09 RX ADMIN — ACETAMINOPHEN 650 MG: 650 SUPPOSITORY RECTAL at 23:00

## 2025-02-10 VITALS — HEART RATE: 88 BPM

## 2025-02-10 VITALS
TEMPERATURE: 98.6 F | DIASTOLIC BLOOD PRESSURE: 96 MMHG | HEART RATE: 97 BPM | SYSTOLIC BLOOD PRESSURE: 195 MMHG | OXYGEN SATURATION: 92 %

## 2025-02-10 VITALS
DIASTOLIC BLOOD PRESSURE: 95 MMHG | SYSTOLIC BLOOD PRESSURE: 190 MMHG | TEMPERATURE: 102.02 F | OXYGEN SATURATION: 96 % | HEART RATE: 102 BPM

## 2025-02-10 VITALS — DIASTOLIC BLOOD PRESSURE: 88 MMHG | SYSTOLIC BLOOD PRESSURE: 155 MMHG

## 2025-02-10 VITALS
HEART RATE: 101 BPM | TEMPERATURE: 100.22 F | DIASTOLIC BLOOD PRESSURE: 94 MMHG | SYSTOLIC BLOOD PRESSURE: 182 MMHG | OXYGEN SATURATION: 92 %

## 2025-02-10 VITALS — HEART RATE: 102 BPM | OXYGEN SATURATION: 97 %

## 2025-02-10 VITALS — OXYGEN SATURATION: 92 % | DIASTOLIC BLOOD PRESSURE: 95 MMHG | SYSTOLIC BLOOD PRESSURE: 190 MMHG | HEART RATE: 101 BPM

## 2025-02-10 VITALS
TEMPERATURE: 99.32 F | DIASTOLIC BLOOD PRESSURE: 69 MMHG | SYSTOLIC BLOOD PRESSURE: 111 MMHG | HEART RATE: 87 BPM | OXYGEN SATURATION: 93 %

## 2025-02-10 VITALS
DIASTOLIC BLOOD PRESSURE: 83 MMHG | HEART RATE: 94 BPM | TEMPERATURE: 100.22 F | OXYGEN SATURATION: 92 % | SYSTOLIC BLOOD PRESSURE: 163 MMHG

## 2025-02-10 VITALS — HEART RATE: 85 BPM

## 2025-02-10 VITALS
HEART RATE: 94 BPM | OXYGEN SATURATION: 92 % | DIASTOLIC BLOOD PRESSURE: 83 MMHG | TEMPERATURE: 100.22 F | SYSTOLIC BLOOD PRESSURE: 163 MMHG

## 2025-02-10 VITALS — DIASTOLIC BLOOD PRESSURE: 78 MMHG | SYSTOLIC BLOOD PRESSURE: 146 MMHG

## 2025-02-10 VITALS — HEART RATE: 98 BPM

## 2025-02-10 VITALS
HEART RATE: 90 BPM | OXYGEN SATURATION: 91 % | TEMPERATURE: 98.1 F | SYSTOLIC BLOOD PRESSURE: 146 MMHG | DIASTOLIC BLOOD PRESSURE: 78 MMHG

## 2025-02-10 VITALS — OXYGEN SATURATION: 92 %

## 2025-02-10 VITALS — HEART RATE: 97 BPM

## 2025-02-10 VITALS — HEART RATE: 94 BPM

## 2025-02-10 VITALS — DIASTOLIC BLOOD PRESSURE: 82 MMHG | SYSTOLIC BLOOD PRESSURE: 161 MMHG

## 2025-02-10 VITALS — OXYGEN SATURATION: 94 %

## 2025-02-10 VITALS — HEART RATE: 90 BPM

## 2025-02-10 LAB
ADD MANUAL DIFF: NO
ALANINE AMINOTRANSFERASE: 89 U/L (ref 16–63)
ALANINE AMINOTRANSFERASE: 92 U/L (ref 16–63)
ALBUMIN GLOBULIN RATIO: 1
ALBUMIN GLOBULIN RATIO: 1
ALBUMIN LEVEL: 3.5 G/DL (ref 3.4–5)
ALBUMIN LEVEL: 3.5 G/DL (ref 3.4–5)
ALKALINE PHOSPHATASE: 120 U/L (ref 46–116)
ALKALINE PHOSPHATASE: 122 U/L (ref 46–116)
AMMONIA: 18 UMOL/L (ref 11–32)
AMYLASE: 117 U/L (ref 25–115)
ANION GAP: 15.8
ASPARTATE AMINO TRANSFERASE: 54 U/L (ref 15–37)
ASPARTATE AMINO TRANSFERASE: 55 U/L (ref 15–37)
BLOOD UREA NITROGEN: 7 MG/DL (ref 7–18)
CALCIUM: 8.8 MG/DL (ref 8.5–10.1)
CARBON DIOXIDE: 26 MMOL/L (ref 21–32)
CHLORIDE: 103 MMOL/L (ref 98–107)
CO2 BLD-SCNC: 26 MMOL/L (ref 21–32)
ESTIMATED GFR (AFRICAN AMERICA: >60
ESTIMATED GFR (NON-AFRICAN AME: >60
GLOBULIN: 3.6 G/DL
GLOBULIN: 3.6 G/DL
GLUCOSE BLD-MCNC: 264 MG/DL (ref 74–106)
HCT VFR BLD CALC: 41.2 % (ref 42–54)
HEMATOCRIT: 41.2 % (ref 42–54)
HEMOGLOBIN: 13.8 G/DL (ref 14–18)
IMMATURE GRANULOCYTES ABS AUTO: 0.03 10^3/UL (ref 0–0.03)
IMMATURE GRANULOCYTES PCT AUTO: 0.4 % (ref 0–0.5)
INR: 1.18
LIPASE: 16 U/L (ref 16–77)
LYMPHOCYTES  ABSOLUTE AUTO: 0.4 10^3/UL (ref 1.2–3.8)
MAGNESIUM: 1.6 MG/DL (ref 1.8–2.4)
MCV RBC: 83.7 FL (ref 80–94)
MEAN CORPUSCULAR HEMOGLOBIN: 28 PG (ref 25.9–34)
MEAN CORPUSCULAR HGB CONC: 33.5 G/DL (ref 29.9–35.2)
MEAN CORPUSCULAR VOLUME: 83.7 FL (ref 80–94)
NT PRO B TYPE NATRIURETIC PEPT: 878 PG/ML (ref ?–900)
PARTIAL THROMBOPLASTIN TIME: 30.2 SEC (ref 22.3–36.2)
PLATELET # BLD: 153 10^3/UL (ref 150–450)
PLATELET COUNT: 153 10^3/UL (ref 150–450)
POTASSIUM SERPLBLD-SCNC: 3.8 MMOL/L (ref 3.5–5.1)
POTASSIUM: 3.8 MMOL/L (ref 3.5–5.1)
PROTHROMBIN TIME: 12.3 SEC (ref 9–11.6)
RED BLOOD COUNT: 4.92 10^6/UL (ref 4.7–6.1)
SODIUM BLD-SCNC: 141 MMOL/L (ref 136–145)
SODIUM: 141 MMOL/L (ref 136–145)
TOTAL PROTEIN: 7.1 G/DL (ref 6.4–8.2)
TOTAL PROTEIN: 7.1 G/DL (ref 6.4–8.2)
WBC # BLD: 8.3 10^3/UL (ref 4–11)
WHITE BLOOD COUNT: 8.3 10^3/UL (ref 4–11)

## 2025-02-10 RX ADMIN — PIOGLITAZONE 30 MG: 15 TABLET ORAL at 08:11

## 2025-02-10 RX ADMIN — METRONIDAZOLE 100 MG: 500 INJECTION, SOLUTION INTRAVENOUS at 10:26

## 2025-02-10 RX ADMIN — ASPIRIN 81 MG: 81 TABLET ORAL at 08:12

## 2025-02-10 RX ADMIN — INSULIN ASPART 0 UNIT: 100 INJECTION, SOLUTION INTRAVENOUS; SUBCUTANEOUS at 11:39

## 2025-02-10 RX ADMIN — MONTELUKAST 10 MG: 10 TABLET, FILM COATED ORAL at 22:01

## 2025-02-10 RX ADMIN — SODIUM CHLORIDE 10 ML: 900 INJECTION, SOLUTION INTRAVENOUS at 09:33

## 2025-02-10 RX ADMIN — INSULIN GLARGINE 40 UNIT: 100 INJECTION, SOLUTION SUBCUTANEOUS at 22:00

## 2025-02-10 RX ADMIN — ISOSORBIDE MONONITRATE 30 MG: 30 TABLET, EXTENDED RELEASE ORAL at 11:38

## 2025-02-10 RX ADMIN — EZETIMIBE 10 MG: 10 TABLET ORAL at 08:11

## 2025-02-10 RX ADMIN — INSULIN ASPART 0 UNIT: 100 INJECTION, SOLUTION INTRAVENOUS; SUBCUTANEOUS at 22:01

## 2025-02-10 RX ADMIN — Medication 50 MCG: at 08:11

## 2025-02-10 RX ADMIN — ACETAMINOPHEN 1000 MG: 500 TABLET ORAL at 15:30

## 2025-02-10 RX ADMIN — LISINOPRIL AND HYDROCHLOROTHIAZIDE 1 TAB: 12.5; 2 TABLET ORAL at 08:11

## 2025-02-10 RX ADMIN — METRONIDAZOLE 100 MG: 500 INJECTION, SOLUTION INTRAVENOUS at 22:00

## 2025-02-10 RX ADMIN — APIXABAN 5 MG: 5 TABLET, FILM COATED ORAL at 21:59

## 2025-02-10 RX ADMIN — INSULIN ASPART 0 UNIT: 100 INJECTION, SOLUTION INTRAVENOUS; SUBCUTANEOUS at 08:12

## 2025-02-10 RX ADMIN — METRONIDAZOLE 100 MG: 500 INJECTION, SOLUTION INTRAVENOUS at 15:30

## 2025-02-10 RX ADMIN — CARVEDILOL 6.25 MG: 6.25 TABLET, FILM COATED ORAL at 22:01

## 2025-02-10 RX ADMIN — AMLODIPINE BESYLATE 10 MG: 5 TABLET ORAL at 08:12

## 2025-02-10 RX ADMIN — Medication 400 MG: at 21:59

## 2025-02-10 RX ADMIN — CARVEDILOL 6.25 MG: 6.25 TABLET, FILM COATED ORAL at 08:12

## 2025-02-10 RX ADMIN — ACETAMINOPHEN 1000 MG: 500 TABLET ORAL at 09:33

## 2025-02-10 RX ADMIN — TAMSULOSIN HYDROCHLORIDE 0.4 MG: 0.4 CAPSULE ORAL at 08:12

## 2025-02-10 RX ADMIN — GLIPIZIDE 10 MG: 10 TABLET ORAL at 17:18

## 2025-02-10 RX ADMIN — APIXABAN 5 MG: 5 TABLET, FILM COATED ORAL at 08:12

## 2025-02-10 RX ADMIN — Medication 1 TAB: at 08:12

## 2025-02-10 RX ADMIN — METFORMIN HYDROCHLORIDE 500 MG: 500 TABLET, EXTENDED RELEASE ORAL at 08:12

## 2025-02-10 RX ADMIN — Medication 400 MG: at 08:11

## 2025-02-10 RX ADMIN — GLIPIZIDE 10 MG: 10 TABLET ORAL at 08:12

## 2025-02-10 RX ADMIN — LISINOPRIL AND HYDROCHLOROTHIAZIDE 1 TAB: 12.5; 2 TABLET ORAL at 21:59

## 2025-02-11 VITALS — HEART RATE: 87 BPM | DIASTOLIC BLOOD PRESSURE: 86 MMHG | SYSTOLIC BLOOD PRESSURE: 157 MMHG | TEMPERATURE: 100.04 F

## 2025-02-11 VITALS — DIASTOLIC BLOOD PRESSURE: 76 MMHG | SYSTOLIC BLOOD PRESSURE: 128 MMHG

## 2025-02-11 VITALS — HEART RATE: 88 BPM

## 2025-02-11 VITALS — TEMPERATURE: 100.4 F

## 2025-02-11 VITALS
SYSTOLIC BLOOD PRESSURE: 128 MMHG | OXYGEN SATURATION: 91 % | HEART RATE: 89 BPM | TEMPERATURE: 99.4 F | DIASTOLIC BLOOD PRESSURE: 76 MMHG

## 2025-02-11 VITALS
OXYGEN SATURATION: 90 % | TEMPERATURE: 99.6 F | SYSTOLIC BLOOD PRESSURE: 149 MMHG | DIASTOLIC BLOOD PRESSURE: 87 MMHG | HEART RATE: 81 BPM

## 2025-02-11 VITALS — HEART RATE: 82 BPM

## 2025-02-11 VITALS
OXYGEN SATURATION: 94 % | TEMPERATURE: 98.42 F | HEART RATE: 63 BPM | SYSTOLIC BLOOD PRESSURE: 129 MMHG | DIASTOLIC BLOOD PRESSURE: 76 MMHG

## 2025-02-11 VITALS
TEMPERATURE: 98.78 F | OXYGEN SATURATION: 91 % | DIASTOLIC BLOOD PRESSURE: 75 MMHG | SYSTOLIC BLOOD PRESSURE: 135 MMHG | HEART RATE: 88 BPM

## 2025-02-11 VITALS — HEART RATE: 86 BPM

## 2025-02-11 VITALS — HEART RATE: 87 BPM

## 2025-02-11 VITALS
TEMPERATURE: 98.78 F | OXYGEN SATURATION: 90 % | DIASTOLIC BLOOD PRESSURE: 75 MMHG | HEART RATE: 87 BPM | SYSTOLIC BLOOD PRESSURE: 157 MMHG

## 2025-02-11 VITALS — TEMPERATURE: 99.86 F

## 2025-02-11 VITALS — OXYGEN SATURATION: 94 %

## 2025-02-11 VITALS — HEART RATE: 85 BPM

## 2025-02-11 VITALS — HEART RATE: 80 BPM

## 2025-02-11 VITALS — OXYGEN SATURATION: 91 %

## 2025-02-11 LAB
ADD MANUAL DIFF: NO
ALANINE AMINOTRANSFERASE: 97 U/L (ref 16–63)
ALBUMIN GLOBULIN RATIO: 0.9
ALBUMIN LEVEL: 3.2 G/DL (ref 3.4–5)
ALKALINE PHOSPHATASE: 102 U/L (ref 46–116)
AMYLASE: 137 U/L (ref 25–115)
ANION GAP: 12.2
ASPARTATE AMINO TRANSFERASE: 73 U/L (ref 15–37)
BLOOD UREA NITROGEN: 12 MG/DL (ref 7–18)
CALCIUM: 8.6 MG/DL (ref 8.5–10.1)
CARBON DIOXIDE: 28.5 MMOL/L (ref 21–32)
CHLORIDE: 101 MMOL/L (ref 98–107)
CO2 BLD-SCNC: 28.5 MMOL/L (ref 21–32)
ESTIMATED GFR (AFRICAN AMERICA: >60
ESTIMATED GFR (NON-AFRICAN AME: >60
GLOBULIN: 3.5 G/DL
GLUCOSE BLD-MCNC: 213 MG/DL (ref 74–106)
HCT VFR BLD CALC: 38 % (ref 42–54)
HEMATOCRIT: 38 % (ref 42–54)
HEMOGLOBIN: 12.7 G/DL (ref 14–18)
IMMATURE GRANULOCYTES ABS AUTO: 0.01 10^3/UL (ref 0–0.03)
IMMATURE GRANULOCYTES PCT AUTO: 0.2 % (ref 0–0.5)
LIPASE: 21 U/L (ref 16–77)
LYMPHOCYTES  ABSOLUTE AUTO: 0.7 10^3/UL (ref 1.2–3.8)
MCV RBC: 84.4 FL (ref 80–94)
MEAN CORPUSCULAR HEMOGLOBIN: 28.2 PG (ref 25.9–34)
MEAN CORPUSCULAR HGB CONC: 33.4 G/DL (ref 29.9–35.2)
MEAN CORPUSCULAR VOLUME: 84.4 FL (ref 80–94)
PLATELET # BLD: 122 10^3/UL (ref 150–450)
PLATELET COUNT: 122 10^3/UL (ref 150–450)
POTASSIUM SERPLBLD-SCNC: 3.7 MMOL/L (ref 3.5–5.1)
POTASSIUM: 3.7 MMOL/L (ref 3.5–5.1)
RED BLOOD COUNT: 4.5 10^6/UL (ref 4.7–6.1)
SODIUM BLD-SCNC: 138 MMOL/L (ref 136–145)
SODIUM: 138 MMOL/L (ref 136–145)
TOTAL PROTEIN: 6.7 G/DL (ref 6.4–8.2)
WBC # BLD: 4.7 10^3/UL (ref 4–11)
WHITE BLOOD COUNT: 4.7 10^3/UL (ref 4–11)

## 2025-02-11 RX ADMIN — MONTELUKAST 10 MG: 10 TABLET, FILM COATED ORAL at 21:39

## 2025-02-11 RX ADMIN — ASPIRIN 81 MG: 81 TABLET ORAL at 08:45

## 2025-02-11 RX ADMIN — EZETIMIBE 10 MG: 10 TABLET ORAL at 08:45

## 2025-02-11 RX ADMIN — ACETAMINOPHEN 1000 MG: 500 TABLET ORAL at 04:47

## 2025-02-11 RX ADMIN — TAMSULOSIN HYDROCHLORIDE 0.4 MG: 0.4 CAPSULE ORAL at 08:45

## 2025-02-11 RX ADMIN — LISINOPRIL AND HYDROCHLOROTHIAZIDE 1 TAB: 12.5; 2 TABLET ORAL at 21:37

## 2025-02-11 RX ADMIN — Medication 50 MCG: at 08:44

## 2025-02-11 RX ADMIN — CARVEDILOL 6.25 MG: 6.25 TABLET, FILM COATED ORAL at 08:45

## 2025-02-11 RX ADMIN — SODIUM CHLORIDE 10 ML: 900 INJECTION, SOLUTION INTRAVENOUS at 16:03

## 2025-02-11 RX ADMIN — Medication 1 TAB: at 08:45

## 2025-02-11 RX ADMIN — INSULIN GLARGINE 40 UNIT: 100 INJECTION, SOLUTION SUBCUTANEOUS at 21:46

## 2025-02-11 RX ADMIN — Medication 400 MG: at 21:39

## 2025-02-11 RX ADMIN — INSULIN ASPART 0 UNIT: 100 INJECTION, SOLUTION INTRAVENOUS; SUBCUTANEOUS at 12:22

## 2025-02-11 RX ADMIN — Medication 400 MG: at 08:44

## 2025-02-11 RX ADMIN — METRONIDAZOLE 100 MG: 500 INJECTION, SOLUTION INTRAVENOUS at 04:04

## 2025-02-11 RX ADMIN — INSULIN ASPART 0 UNIT: 100 INJECTION, SOLUTION INTRAVENOUS; SUBCUTANEOUS at 21:44

## 2025-02-11 RX ADMIN — APIXABAN 5 MG: 5 TABLET, FILM COATED ORAL at 08:45

## 2025-02-11 RX ADMIN — APIXABAN 5 MG: 5 TABLET, FILM COATED ORAL at 21:39

## 2025-02-11 RX ADMIN — PIOGLITAZONE 30 MG: 15 TABLET ORAL at 08:44

## 2025-02-11 RX ADMIN — METRONIDAZOLE 100 MG: 500 INJECTION, SOLUTION INTRAVENOUS at 21:32

## 2025-02-11 RX ADMIN — AMLODIPINE BESYLATE 10 MG: 5 TABLET ORAL at 08:45

## 2025-02-11 RX ADMIN — GLIPIZIDE 10 MG: 10 TABLET ORAL at 08:45

## 2025-02-11 RX ADMIN — CARVEDILOL 6.25 MG: 6.25 TABLET, FILM COATED ORAL at 21:39

## 2025-02-11 RX ADMIN — INSULIN ASPART 0 UNIT: 100 INJECTION, SOLUTION INTRAVENOUS; SUBCUTANEOUS at 08:46

## 2025-02-11 RX ADMIN — ISOSORBIDE MONONITRATE 30 MG: 30 TABLET, EXTENDED RELEASE ORAL at 08:44

## 2025-02-11 RX ADMIN — PANTOPRAZOLE SODIUM 40 MG: 40 INJECTION, POWDER, FOR SOLUTION INTRAVENOUS at 08:51

## 2025-02-11 RX ADMIN — LISINOPRIL AND HYDROCHLOROTHIAZIDE 1 TAB: 12.5; 2 TABLET ORAL at 08:44

## 2025-02-11 RX ADMIN — METRONIDAZOLE 100 MG: 500 INJECTION, SOLUTION INTRAVENOUS at 16:03

## 2025-02-11 RX ADMIN — METRONIDAZOLE 100 MG: 500 INJECTION, SOLUTION INTRAVENOUS at 09:40

## 2025-02-12 VITALS — OXYGEN SATURATION: 90 %

## 2025-02-12 VITALS
OXYGEN SATURATION: 93 % | SYSTOLIC BLOOD PRESSURE: 144 MMHG | DIASTOLIC BLOOD PRESSURE: 80 MMHG | HEART RATE: 85 BPM | TEMPERATURE: 99.9 F

## 2025-02-12 VITALS
DIASTOLIC BLOOD PRESSURE: 71 MMHG | SYSTOLIC BLOOD PRESSURE: 128 MMHG | TEMPERATURE: 98 F | HEART RATE: 82 BPM | OXYGEN SATURATION: 90 %

## 2025-02-12 VITALS — OXYGEN SATURATION: 93 % | SYSTOLIC BLOOD PRESSURE: 144 MMHG | HEART RATE: 85 BPM | DIASTOLIC BLOOD PRESSURE: 80 MMHG

## 2025-02-12 VITALS — HEART RATE: 87 BPM

## 2025-02-12 VITALS — HEART RATE: 83 BPM

## 2025-02-12 VITALS — HEART RATE: 85 BPM

## 2025-02-12 VITALS — HEART RATE: 84 BPM

## 2025-02-12 VITALS — HEART RATE: 82 BPM

## 2025-02-12 VITALS — OXYGEN SATURATION: 95 %

## 2025-02-12 LAB
ADD MANUAL DIFF: NO
ALANINE AMINOTRANSFERASE: 83 U/L (ref 16–63)
ALBUMIN GLOBULIN RATIO: 0.9
ALBUMIN LEVEL: 3.2 G/DL (ref 3.4–5)
ALKALINE PHOSPHATASE: 101 U/L (ref 46–116)
AMYLASE: 163 U/L (ref 25–115)
ANION GAP: 9.6
ASPARTATE AMINO TRANSFERASE: 64 U/L (ref 15–37)
BLOOD UREA NITROGEN: 10 MG/DL (ref 7–18)
CALCIUM: 8.7 MG/DL (ref 8.5–10.1)
CARBON DIOXIDE: 31.5 MMOL/L (ref 21–32)
CHLORIDE: 100 MMOL/L (ref 98–107)
CO2 BLD-SCNC: 31.5 MMOL/L (ref 21–32)
ESTIMATED GFR (AFRICAN AMERICA: >60
ESTIMATED GFR (NON-AFRICAN AME: >60
GLOBULIN: 3.6 G/DL
GLUCOSE BLD-MCNC: 84 MG/DL (ref 74–106)
HCT VFR BLD CALC: 39.7 % (ref 42–54)
HEMATOCRIT: 39.7 % (ref 42–54)
HEMOGLOBIN: 13.2 G/DL (ref 14–18)
IMMATURE GRANULOCYTES ABS AUTO: 0 10^3/UL (ref 0–0.03)
IMMATURE GRANULOCYTES PCT AUTO: 0 % (ref 0–0.5)
LIPASE: 26 U/L (ref 16–77)
LYMPHOCYTES  ABSOLUTE AUTO: 1.2 10^3/UL (ref 1.2–3.8)
MCV RBC: 84.3 FL (ref 80–94)
MEAN CORPUSCULAR HEMOGLOBIN: 28 PG (ref 25.9–34)
MEAN CORPUSCULAR HGB CONC: 33.2 G/DL (ref 29.9–35.2)
MEAN CORPUSCULAR VOLUME: 84.3 FL (ref 80–94)
NT PRO B TYPE NATRIURETIC PEPT: 54 PG/ML (ref ?–900)
PLATELET # BLD: 129 10^3/UL (ref 150–450)
PLATELET COUNT: 129 10^3/UL (ref 150–450)
POTASSIUM SERPLBLD-SCNC: 3.1 MMOL/L (ref 3.5–5.1)
POTASSIUM: 3.1 MMOL/L (ref 3.5–5.1)
RED BLOOD COUNT: 4.71 10^6/UL (ref 4.7–6.1)
SODIUM BLD-SCNC: 138 MMOL/L (ref 136–145)
SODIUM: 138 MMOL/L (ref 136–145)
TOTAL PROTEIN: 6.8 G/DL (ref 6.4–8.2)
WBC # BLD: 3.4 10^3/UL (ref 4–11)
WHITE BLOOD COUNT: 3.4 10^3/UL (ref 4–11)

## 2025-02-12 RX ADMIN — ISOSORBIDE MONONITRATE 30 MG: 30 TABLET, EXTENDED RELEASE ORAL at 09:50

## 2025-02-12 RX ADMIN — APIXABAN 5 MG: 5 TABLET, FILM COATED ORAL at 09:51

## 2025-02-12 RX ADMIN — POTASSIUM CHLORIDE 67.5 MEQ: 149 INJECTION, SOLUTION, CONCENTRATE INTRAVENOUS at 09:30

## 2025-02-12 RX ADMIN — POTASSIUM CHLORIDE 10 MEQ: 750 TABLET, EXTENDED RELEASE ORAL at 09:52

## 2025-02-12 RX ADMIN — PANTOPRAZOLE SODIUM 40 MG: 40 INJECTION, POWDER, FOR SOLUTION INTRAVENOUS at 09:52

## 2025-02-12 RX ADMIN — LISINOPRIL AND HYDROCHLOROTHIAZIDE 1 TAB: 12.5; 2 TABLET ORAL at 09:50

## 2025-02-12 RX ADMIN — ASPIRIN 81 MG: 81 TABLET ORAL at 09:51

## 2025-02-12 RX ADMIN — METFORMIN HYDROCHLORIDE 500 MG: 500 TABLET, EXTENDED RELEASE ORAL at 09:51

## 2025-02-12 RX ADMIN — METRONIDAZOLE 100 MG: 500 INJECTION, SOLUTION INTRAVENOUS at 04:47

## 2025-02-12 RX ADMIN — AMLODIPINE BESYLATE 10 MG: 5 TABLET ORAL at 09:51

## 2025-02-12 RX ADMIN — TAMSULOSIN HYDROCHLORIDE 0.4 MG: 0.4 CAPSULE ORAL at 09:51

## 2025-02-12 RX ADMIN — PIOGLITAZONE 30 MG: 15 TABLET ORAL at 09:50

## 2025-02-12 RX ADMIN — Medication 1 TAB: at 09:51

## 2025-02-12 RX ADMIN — METRONIDAZOLE 100 MG: 500 INJECTION, SOLUTION INTRAVENOUS at 09:58

## 2025-02-12 RX ADMIN — CARVEDILOL 6.25 MG: 6.25 TABLET, FILM COATED ORAL at 09:51

## 2025-02-12 RX ADMIN — Medication 400 MG: at 09:51

## 2025-02-12 RX ADMIN — GLIPIZIDE 10 MG: 10 TABLET ORAL at 09:52

## 2025-02-12 RX ADMIN — Medication 50 MCG: at 09:49

## 2025-02-12 RX ADMIN — EZETIMIBE 10 MG: 10 TABLET ORAL at 09:51

## 2025-03-21 ENCOUNTER — HOSPITAL ENCOUNTER
Dept: HOSPITAL 101 - LAB | Age: 70
Discharge: HOME | End: 2025-03-21
Payer: COMMERCIAL

## 2025-03-21 ENCOUNTER — HOSPITAL ENCOUNTER
Dept: HOSPITAL 101 - CARD | Age: 70
Discharge: HOME | End: 2025-03-21
Payer: COMMERCIAL

## 2025-03-21 DIAGNOSIS — I25.10: ICD-10-CM

## 2025-03-21 DIAGNOSIS — E11.65: ICD-10-CM

## 2025-03-21 DIAGNOSIS — E11.65: Primary | ICD-10-CM

## 2025-03-21 DIAGNOSIS — I25.83: ICD-10-CM

## 2025-03-21 DIAGNOSIS — R68.89: Primary | ICD-10-CM

## 2025-03-21 DIAGNOSIS — Z79.4: ICD-10-CM

## 2025-03-21 DIAGNOSIS — R07.9: ICD-10-CM

## 2025-03-21 PROCEDURE — 83036 HEMOGLOBIN GLYCOSYLATED A1C: CPT

## 2025-03-21 PROCEDURE — 93306 TTE W/DOPPLER COMPLETE: CPT

## 2025-03-21 PROCEDURE — 82570 ASSAY OF URINE CREATININE: CPT

## 2025-03-21 PROCEDURE — 36415 COLL VENOUS BLD VENIPUNCTURE: CPT

## 2025-03-21 PROCEDURE — 82043 UR ALBUMIN QUANTITATIVE: CPT

## 2025-03-24 ENCOUNTER — HOSPITAL ENCOUNTER (OUTPATIENT)
Dept: HOSPITAL 101 - LAB | Age: 70
Discharge: HOME | End: 2025-03-24
Payer: COMMERCIAL

## 2025-03-24 DIAGNOSIS — Z79.4: ICD-10-CM

## 2025-03-24 DIAGNOSIS — E11.65: Primary | ICD-10-CM

## 2025-03-24 LAB — MICROALBUM CREATININE RATIO UR: 41.8 MG/G (ref 0–29.9)

## 2025-03-24 PROCEDURE — 82043 UR ALBUMIN QUANTITATIVE: CPT

## 2025-03-24 PROCEDURE — 82570 ASSAY OF URINE CREATININE: CPT
